# Patient Record
Sex: MALE | Race: WHITE | URBAN - NONMETROPOLITAN AREA
[De-identification: names, ages, dates, MRNs, and addresses within clinical notes are randomized per-mention and may not be internally consistent; named-entity substitution may affect disease eponyms.]

---

## 2020-10-23 ENCOUNTER — DOCTOR'S OFFICE (OUTPATIENT)
Dept: URBAN - NONMETROPOLITAN AREA CLINIC 1 | Facility: CLINIC | Age: 40
Setting detail: OPHTHALMOLOGY
End: 2020-10-23
Payer: COMMERCIAL

## 2020-10-23 ENCOUNTER — RX ONLY (RX ONLY)
Age: 40
End: 2020-10-23

## 2020-10-23 DIAGNOSIS — H43.813: ICD-10-CM

## 2020-10-23 DIAGNOSIS — H16.221: ICD-10-CM

## 2020-10-23 DIAGNOSIS — H16.223: ICD-10-CM

## 2020-10-23 DIAGNOSIS — H16.222: ICD-10-CM

## 2020-10-23 PROCEDURE — 83861 MICROFLUID ANALY TEARS: CPT | Performed by: OPHTHALMOLOGY

## 2020-10-23 PROCEDURE — 99203 OFFICE O/P NEW LOW 30 MIN: CPT | Performed by: OPHTHALMOLOGY

## 2020-10-23 PROCEDURE — 92134 CPTRZ OPH DX IMG PST SGM RTA: CPT | Performed by: OPHTHALMOLOGY

## 2020-10-23 ASSESSMENT — VISUAL ACUITY
OS_BCVA: 20/20-1
OD_BCVA: 20/20-1

## 2020-10-23 ASSESSMENT — LACRIMAL DUCT - ASSESSMENT
OD_LACRIMAL_DUCT: EPIPHORA
OS_LACRIMAL_DUCT: EPIPHORA

## 2020-10-23 ASSESSMENT — TONOMETRY
OD_IOP_MMHG: 17
OS_IOP_MMHG: 16

## 2020-10-23 ASSESSMENT — CONFRONTATIONAL VISUAL FIELD TEST (CVF)
OD_FINDINGS: FULL
OS_FINDINGS: FULL

## 2020-10-23 ASSESSMENT — REFRACTION_CURRENTRX
OS_OVR_VA: 20/
OD_OVR_VA: 20/

## 2020-12-22 ENCOUNTER — DOCTOR'S OFFICE (OUTPATIENT)
Dept: URBAN - NONMETROPOLITAN AREA CLINIC 1 | Facility: CLINIC | Age: 40
Setting detail: OPHTHALMOLOGY
End: 2020-12-22
Payer: COMMERCIAL

## 2020-12-22 DIAGNOSIS — H16.223: ICD-10-CM

## 2020-12-22 PROCEDURE — 92012 INTRM OPH EXAM EST PATIENT: CPT | Performed by: OPHTHALMOLOGY

## 2020-12-22 ASSESSMENT — TONOMETRY
OD_IOP_MMHG: 16
OS_IOP_MMHG: 16

## 2020-12-22 ASSESSMENT — KERATOMETRY
OD_AXISANGLE_DEGREES: 101
OD_K2POWER_DIOPTERS: 43.50
OD_K1POWER_DIOPTERS: 43.00
OS_AXISANGLE_DEGREES: 025
OS_K2POWER_DIOPTERS: 44.00
OS_K1POWER_DIOPTERS: 43.75

## 2020-12-22 ASSESSMENT — REFRACTION_CURRENTRX
OD_OVR_VA: 20/
OS_OVR_VA: 20/

## 2020-12-22 ASSESSMENT — REFRACTION_AUTOREFRACTION
OD_SPHERE: 0.00
OS_CYLINDER: -0.50
OD_CYLINDER: -0.25
OD_AXIS: 106
OS_AXIS: 049
OS_SPHERE: -0.25

## 2020-12-22 ASSESSMENT — SPHEQUIV_DERIVED
OS_SPHEQUIV: -0.5
OD_SPHEQUIV: -0.125

## 2020-12-22 ASSESSMENT — DRY EYES - PHYSICIAN NOTES
OD_GENERALCOMMENTS: OS
OS_GENERALCOMMENTS: OS

## 2020-12-22 ASSESSMENT — AXIALLENGTH_DERIVED
OS_AL: 23.6491
OD_AL: 23.7333

## 2020-12-22 ASSESSMENT — LACRIMAL DUCT - ASSESSMENT
OS_LACRIMAL_DUCT: EPIPHORA
OD_LACRIMAL_DUCT: EPIPHORA

## 2020-12-22 ASSESSMENT — VISUAL ACUITY
OS_BCVA: 20/20
OD_BCVA: 20/20

## 2020-12-22 ASSESSMENT — CONFRONTATIONAL VISUAL FIELD TEST (CVF)
OD_FINDINGS: FULL
OS_FINDINGS: FULL

## 2021-01-15 ENCOUNTER — DOCTOR'S OFFICE (OUTPATIENT)
Dept: URBAN - NONMETROPOLITAN AREA CLINIC 1 | Facility: CLINIC | Age: 41
Setting detail: OPHTHALMOLOGY
End: 2021-01-15
Payer: COMMERCIAL

## 2021-01-15 DIAGNOSIS — H43.813: ICD-10-CM

## 2021-01-15 PROCEDURE — 92201 OPSCPY EXTND RTA DRAW UNI/BI: CPT | Performed by: OPHTHALMOLOGY

## 2021-01-15 PROCEDURE — 92134 CPTRZ OPH DX IMG PST SGM RTA: CPT | Performed by: OPHTHALMOLOGY

## 2021-01-15 PROCEDURE — 92014 COMPRE OPH EXAM EST PT 1/>: CPT | Performed by: OPHTHALMOLOGY

## 2021-01-15 ASSESSMENT — LACRIMAL DUCT - ASSESSMENT
OD_LACRIMAL_DUCT: EPIPHORA
OS_LACRIMAL_DUCT: EPIPHORA

## 2021-01-15 ASSESSMENT — DRY EYES - PHYSICIAN NOTES
OD_GENERALCOMMENTS: OS
OS_GENERALCOMMENTS: OS

## 2021-01-15 ASSESSMENT — CONFRONTATIONAL VISUAL FIELD TEST (CVF)
OS_FINDINGS: FULL
OD_FINDINGS: FULL

## 2021-01-26 ASSESSMENT — AXIALLENGTH_DERIVED
OD_AL: 23.7333
OS_AL: 23.6491

## 2021-01-26 ASSESSMENT — REFRACTION_AUTOREFRACTION
OD_SPHERE: 0.00
OD_CYLINDER: -0.25
OS_AXIS: 049
OS_CYLINDER: -0.50
OD_AXIS: 106
OS_SPHERE: -0.25

## 2021-01-26 ASSESSMENT — REFRACTION_CURRENTRX
OD_OVR_VA: 20/
OS_OVR_VA: 20/

## 2021-01-26 ASSESSMENT — KERATOMETRY
OS_K1POWER_DIOPTERS: 43.75
OS_AXISANGLE_DEGREES: 025
OD_AXISANGLE_DEGREES: 101
OD_K2POWER_DIOPTERS: 43.50
OS_K2POWER_DIOPTERS: 44.00
OD_K1POWER_DIOPTERS: 43.00

## 2021-01-26 ASSESSMENT — VISUAL ACUITY
OS_BCVA: 20/20
OD_BCVA: 20/20-1

## 2021-01-26 ASSESSMENT — SPHEQUIV_DERIVED
OS_SPHEQUIV: -0.5
OD_SPHEQUIV: -0.125

## 2021-02-05 ENCOUNTER — DOCTOR'S OFFICE (OUTPATIENT)
Dept: URBAN - NONMETROPOLITAN AREA CLINIC 1 | Facility: CLINIC | Age: 41
Setting detail: OPHTHALMOLOGY
End: 2021-02-05
Payer: COMMERCIAL

## 2021-02-05 DIAGNOSIS — H16.222: ICD-10-CM

## 2021-02-05 DIAGNOSIS — H43.813: ICD-10-CM

## 2021-02-05 DIAGNOSIS — H16.223: ICD-10-CM

## 2021-02-05 PROBLEM — G24.5: Status: RESOLVED | Noted: 2020-10-23 | Resolved: 2021-02-05

## 2021-02-05 PROBLEM — H10.11 ALLERGIC CONJUNCTIVITS; RIGHT EYE, LEFT EYE, BOTH EYES: Status: RESOLVED | Noted: 2020-10-23 | Resolved: 2021-02-05

## 2021-02-05 PROBLEM — H10.13 ALLERGIC CONJUNCTIVITS; RIGHT EYE, LEFT EYE, BOTH EYES: Status: RESOLVED | Noted: 2020-10-23 | Resolved: 2021-02-05

## 2021-02-05 PROBLEM — H10.12 ALLERGIC CONJUNCTIVITS; RIGHT EYE, LEFT EYE, BOTH EYES: Status: RESOLVED | Noted: 2020-10-23 | Resolved: 2021-02-05

## 2021-02-05 PROCEDURE — 92012 INTRM OPH EXAM EST PATIENT: CPT | Performed by: OPHTHALMOLOGY

## 2021-02-05 PROCEDURE — 83861 MICROFLUID ANALY TEARS: CPT | Performed by: OPHTHALMOLOGY

## 2021-02-05 ASSESSMENT — CONFRONTATIONAL VISUAL FIELD TEST (CVF)
OD_FINDINGS: FULL
OS_FINDINGS: FULL

## 2021-02-05 ASSESSMENT — KERATOMETRY
OD_K2POWER_DIOPTERS: 43.50
OD_K1POWER_DIOPTERS: 43.00
OS_K2POWER_DIOPTERS: 44.00
OS_AXISANGLE_DEGREES: 025
OD_AXISANGLE_DEGREES: 101
OS_K1POWER_DIOPTERS: 43.75

## 2021-02-05 ASSESSMENT — REFRACTION_AUTOREFRACTION
OD_SPHERE: 0.00
OS_AXIS: 049
OD_CYLINDER: -0.25
OS_CYLINDER: -0.50
OS_SPHERE: -0.25
OD_AXIS: 106

## 2021-02-05 ASSESSMENT — LACRIMAL DUCT - ASSESSMENT
OD_LACRIMAL_DUCT: EPIPHORA
OS_LACRIMAL_DUCT: EPIPHORA

## 2021-02-05 ASSESSMENT — VISUAL ACUITY
OD_BCVA: 20/20
OS_BCVA: 20/20

## 2021-02-05 ASSESSMENT — AXIALLENGTH_DERIVED
OS_AL: 23.6491
OD_AL: 23.7333

## 2021-02-05 ASSESSMENT — SPHEQUIV_DERIVED
OD_SPHEQUIV: -0.125
OS_SPHEQUIV: -0.5

## 2021-02-05 ASSESSMENT — DRY EYES - PHYSICIAN NOTES
OD_GENERALCOMMENTS: OS
OS_GENERALCOMMENTS: OS

## 2021-02-05 ASSESSMENT — REFRACTION_CURRENTRX
OD_OVR_VA: 20/
OS_OVR_VA: 20/

## 2021-02-07 ENCOUNTER — APPOINTMENT (EMERGENCY)
Dept: RADIOLOGY | Facility: HOSPITAL | Age: 41
End: 2021-02-07
Payer: COMMERCIAL

## 2021-02-07 ENCOUNTER — HOSPITAL ENCOUNTER (EMERGENCY)
Facility: HOSPITAL | Age: 41
Discharge: HOME | End: 2021-02-07
Attending: EMERGENCY MEDICINE
Payer: COMMERCIAL

## 2021-02-07 VITALS
OXYGEN SATURATION: 95 % | HEART RATE: 103 BPM | TEMPERATURE: 102.2 F | RESPIRATION RATE: 30 BRPM | BODY MASS INDEX: 36.1 KG/M2 | DIASTOLIC BLOOD PRESSURE: 68 MMHG | HEIGHT: 67 IN | SYSTOLIC BLOOD PRESSURE: 127 MMHG | WEIGHT: 230 LBS

## 2021-02-07 DIAGNOSIS — U07.1 COVID-19: Primary | ICD-10-CM

## 2021-02-07 LAB
ALBUMIN SERPL BCP-MCNC: 3.2 G/DL (ref 3.5–5)
ALP SERPL-CCNC: 51 U/L (ref 46–116)
ALT SERPL W P-5'-P-CCNC: 45 U/L (ref 12–78)
ANION GAP SERPL CALCULATED.3IONS-SCNC: 7 MMOL/L (ref 4–13)
AST SERPL W P-5'-P-CCNC: 34 U/L (ref 5–45)
BASOPHILS # BLD AUTO: 0.01 THOUSANDS/ΜL (ref 0–0.1)
BASOPHILS NFR BLD AUTO: 0 % (ref 0–1)
BILIRUB SERPL-MCNC: 0.24 MG/DL (ref 0.2–1)
BUN SERPL-MCNC: 24 MG/DL (ref 5–25)
CALCIUM ALBUM COR SERPL-MCNC: 8.6 MG/DL (ref 8.3–10.1)
CALCIUM SERPL-MCNC: 8 MG/DL (ref 8.3–10.1)
CHLORIDE SERPL-SCNC: 103 MMOL/L (ref 100–108)
CK MB SERPL-MCNC: 2 NG/ML (ref 0–5)
CK MB SERPL-MCNC: <1 % (ref 0–2.5)
CK SERPL-CCNC: 413 U/L (ref 39–308)
CO2 SERPL-SCNC: 28 MMOL/L (ref 21–32)
CREAT SERPL-MCNC: 1.25 MG/DL (ref 0.6–1.3)
CRP SERPL QL: 42.9 MG/L
EOSINOPHIL # BLD AUTO: 0.01 THOUSAND/ΜL (ref 0–0.61)
EOSINOPHIL NFR BLD AUTO: 0 % (ref 0–6)
ERYTHROCYTE [DISTWIDTH] IN BLOOD BY AUTOMATED COUNT: 12.2 % (ref 11.6–15.1)
FLUAV RNA RESP QL NAA+PROBE: NEGATIVE
FLUBV RNA RESP QL NAA+PROBE: NEGATIVE
GFR SERPL CREATININE-BSD FRML MDRD: 72 ML/MIN/1.73SQ M
GLUCOSE SERPL-MCNC: 151 MG/DL (ref 65–140)
HCT VFR BLD AUTO: 43.3 % (ref 36.5–49.3)
HGB BLD-MCNC: 14.3 G/DL (ref 12–17)
IMM GRANULOCYTES # BLD AUTO: 0.01 THOUSAND/UL (ref 0–0.2)
IMM GRANULOCYTES NFR BLD AUTO: 0 % (ref 0–2)
LACTATE SERPL-SCNC: 1.3 MMOL/L (ref 0.5–2)
LYMPHOCYTES # BLD AUTO: 0.37 THOUSANDS/ΜL (ref 0.6–4.47)
LYMPHOCYTES NFR BLD AUTO: 10 % (ref 14–44)
MAGNESIUM SERPL-MCNC: 1.7 MG/DL (ref 1.6–2.6)
MCH RBC QN AUTO: 30.7 PG (ref 26.8–34.3)
MCHC RBC AUTO-ENTMCNC: 33 G/DL (ref 31.4–37.4)
MCV RBC AUTO: 93 FL (ref 82–98)
MONOCYTES # BLD AUTO: 0.29 THOUSAND/ΜL (ref 0.17–1.22)
MONOCYTES NFR BLD AUTO: 8 % (ref 4–12)
NEUTROPHILS # BLD AUTO: 3.12 THOUSANDS/ΜL (ref 1.85–7.62)
NEUTS SEG NFR BLD AUTO: 82 % (ref 43–75)
NRBC BLD AUTO-RTO: 0 /100 WBCS
NT-PROBNP SERPL-MCNC: 9 PG/ML
PLATELET # BLD AUTO: 138 THOUSANDS/UL (ref 149–390)
PMV BLD AUTO: 10.9 FL (ref 8.9–12.7)
POTASSIUM SERPL-SCNC: 4.1 MMOL/L (ref 3.5–5.3)
PROT SERPL-MCNC: 6.6 G/DL (ref 6.4–8.2)
RBC # BLD AUTO: 4.66 MILLION/UL (ref 3.88–5.62)
RSV RNA RESP QL NAA+PROBE: NEGATIVE
SARS-COV-2 RNA RESP QL NAA+PROBE: POSITIVE
SODIUM SERPL-SCNC: 138 MMOL/L (ref 136–145)
TROPONIN I SERPL-MCNC: <0.02 NG/ML
WBC # BLD AUTO: 3.81 THOUSAND/UL (ref 4.31–10.16)

## 2021-02-07 PROCEDURE — 86140 C-REACTIVE PROTEIN: CPT | Performed by: EMERGENCY MEDICINE

## 2021-02-07 PROCEDURE — 83880 ASSAY OF NATRIURETIC PEPTIDE: CPT | Performed by: EMERGENCY MEDICINE

## 2021-02-07 PROCEDURE — 99285 EMERGENCY DEPT VISIT HI MDM: CPT | Performed by: EMERGENCY MEDICINE

## 2021-02-07 PROCEDURE — 82550 ASSAY OF CK (CPK): CPT | Performed by: EMERGENCY MEDICINE

## 2021-02-07 PROCEDURE — 93005 ELECTROCARDIOGRAM TRACING: CPT

## 2021-02-07 PROCEDURE — 83605 ASSAY OF LACTIC ACID: CPT | Performed by: EMERGENCY MEDICINE

## 2021-02-07 PROCEDURE — 83735 ASSAY OF MAGNESIUM: CPT | Performed by: EMERGENCY MEDICINE

## 2021-02-07 PROCEDURE — 84484 ASSAY OF TROPONIN QUANT: CPT | Performed by: EMERGENCY MEDICINE

## 2021-02-07 PROCEDURE — 85025 COMPLETE CBC W/AUTO DIFF WBC: CPT | Performed by: EMERGENCY MEDICINE

## 2021-02-07 PROCEDURE — 99284 EMERGENCY DEPT VISIT MOD MDM: CPT

## 2021-02-07 PROCEDURE — 71045 X-RAY EXAM CHEST 1 VIEW: CPT

## 2021-02-07 PROCEDURE — 80053 COMPREHEN METABOLIC PANEL: CPT | Performed by: EMERGENCY MEDICINE

## 2021-02-07 PROCEDURE — 36415 COLL VENOUS BLD VENIPUNCTURE: CPT | Performed by: EMERGENCY MEDICINE

## 2021-02-07 PROCEDURE — 0241U HB NFCT DS VIR RESP RNA 4 TRGT: CPT | Performed by: EMERGENCY MEDICINE

## 2021-02-07 PROCEDURE — 96374 THER/PROPH/DIAG INJ IV PUSH: CPT

## 2021-02-07 PROCEDURE — 87040 BLOOD CULTURE FOR BACTERIA: CPT | Performed by: EMERGENCY MEDICINE

## 2021-02-07 PROCEDURE — 82553 CREATINE MB FRACTION: CPT | Performed by: EMERGENCY MEDICINE

## 2021-02-07 PROCEDURE — 96361 HYDRATE IV INFUSION ADD-ON: CPT

## 2021-02-07 RX ORDER — ACETAMINOPHEN 325 MG/1
650 TABLET ORAL ONCE
Status: COMPLETED | OUTPATIENT
Start: 2021-02-07 | End: 2021-02-07

## 2021-02-07 RX ORDER — SODIUM CHLORIDE 9 MG/ML
150 INJECTION, SOLUTION INTRAVENOUS CONTINUOUS
Status: DISCONTINUED | OUTPATIENT
Start: 2021-02-07 | End: 2021-02-08 | Stop reason: HOSPADM

## 2021-02-07 RX ORDER — IBUPROFEN 600 MG/1
600 TABLET ORAL ONCE
Status: COMPLETED | OUTPATIENT
Start: 2021-02-07 | End: 2021-02-07

## 2021-02-07 RX ORDER — KETOROLAC TROMETHAMINE 30 MG/ML
15 INJECTION, SOLUTION INTRAMUSCULAR; INTRAVENOUS ONCE
Status: COMPLETED | OUTPATIENT
Start: 2021-02-07 | End: 2021-02-07

## 2021-02-07 RX ADMIN — ACETAMINOPHEN 650 MG: 325 TABLET ORAL at 21:09

## 2021-02-07 RX ADMIN — IBUPROFEN 600 MG: 600 TABLET ORAL at 22:22

## 2021-02-07 RX ADMIN — SODIUM CHLORIDE 1000 ML: 0.9 INJECTION, SOLUTION INTRAVENOUS at 21:15

## 2021-02-07 RX ADMIN — SODIUM CHLORIDE 1000 ML: 0.9 INJECTION, SOLUTION INTRAVENOUS at 21:14

## 2021-02-07 RX ADMIN — KETOROLAC TROMETHAMINE 15 MG: 30 INJECTION, SOLUTION INTRAMUSCULAR; INTRAVENOUS at 21:09

## 2021-02-07 NOTE — Clinical Note
Mary Nish was seen and treated in our emergency department on 2/7/2021  Diagnosis:     Sherley Vazquez  may return to work on return date  He may return on this date: 02/18/2021         If you have any questions or concerns, please don't hesitate to call        Amy Lopez DO    ______________________________           _______________          _______________  Hospital Representative                              Date                                Time

## 2021-02-08 LAB
ATRIAL RATE: 105 BPM
P AXIS: 59 DEGREES
PR INTERVAL: 130 MS
QRS AXIS: 76 DEGREES
QRSD INTERVAL: 96 MS
QT INTERVAL: 318 MS
QTC INTERVAL: 420 MS
T WAVE AXIS: 41 DEGREES
VENTRICULAR RATE: 105 BPM

## 2021-02-08 NOTE — ED PROVIDER NOTES
History  Chief Complaint   Patient presents with    Generalized Body Aches     Patient presents to the ED via walk-in for evaluation of generalized body aches onset, chills  Patient is a 17-year-old male, otherwise healthy, presenting to the emergency department complaining of body aches, fevers, chills, mild cough, mild headache, symptoms present x4 days now, denies any nausea vomiting or diarrhea, no sore throat, no ear, no chest pain or shortness of breath abdominal, denies any sick contacts, no known COVID-19          None       Past Medical History:   Diagnosis Date    Asthma        History reviewed  No pertinent surgical history  History reviewed  No pertinent family history  I have reviewed and agree with the history as documented  E-Cigarette/Vaping    E-Cigarette Use Never User      E-Cigarette/Vaping Substances     Social History     Tobacco Use    Smoking status: Never Smoker    Smokeless tobacco: Never Used   Substance Use Topics    Alcohol use: Yes    Drug use: Never       Review of Systems   Constitutional: Positive for chills, fatigue and fever  HENT: Positive for congestion  Eyes: Negative  Respiratory: Positive for cough  Cardiovascular: Negative  Gastrointestinal: Negative  Endocrine: Negative  Genitourinary: Negative  Musculoskeletal: Positive for myalgias  Skin: Negative  Allergic/Immunologic: Negative  Neurological: Negative  Hematological: Negative  Psychiatric/Behavioral: Negative  Physical Exam  Physical Exam  Constitutional:       Appearance: He is well-developed  He is ill-appearing  HENT:      Head: Normocephalic and atraumatic  Nose: Nose normal       Mouth/Throat:      Mouth: Mucous membranes are moist    Eyes:      Conjunctiva/sclera: Conjunctivae normal       Pupils: Pupils are equal, round, and reactive to light  Neck:      Musculoskeletal: Normal range of motion and neck supple     Cardiovascular:      Rate and Rhythm: Tachycardia present  Pulmonary:      Effort: Pulmonary effort is normal    Abdominal:      Palpations: Abdomen is soft  Tenderness: There is no abdominal tenderness  Musculoskeletal: Normal range of motion  Skin:     General: Skin is warm and dry  Neurological:      Mental Status: He is alert and oriented to person, place, and time  Vital Signs  ED Triage Vitals [02/07/21 2052]   Temperature Pulse Respirations Blood Pressure SpO2   (!) 103 5 °F (39 7 °C) (!) 109 22 141/86 96 %      Temp Source Heart Rate Source Patient Position - Orthostatic VS BP Location FiO2 (%)   Temporal Monitor Lying Right arm --      Pain Score       5           Vitals:    02/07/21 2100 02/07/21 2115 02/07/21 2130 02/07/21 2145   BP: 135/83 134/86 141/77 127/68   Pulse: (!) 107 103 (!) 107 103   Patient Position - Orthostatic VS:             Visual Acuity      ED Medications  Medications   sodium chloride 0 9 % bolus 1,000 mL (0 mL Intravenous Stopped 2/7/21 2220)   ketorolac (TORADOL) injection 15 mg (15 mg Intravenous Given 2/7/21 2109)   acetaminophen (TYLENOL) tablet 650 mg (650 mg Oral Given 2/7/21 2109)   sodium chloride 0 9 % bolus 1,000 mL (0 mL Intravenous Stopped 2/7/21 2202)   ibuprofen (MOTRIN) tablet 600 mg (600 mg Oral Given 2/7/21 2222)       Diagnostic Studies  Results Reviewed     Procedure Component Value Units Date/Time    Blood culture #1 [[id number]] Collected: 02/07/21 2107    Lab Status: Preliminary result Specimen: Blood from Arm, Right Updated: 02/10/21 1002     Blood Culture No Growth at 48 hrs  Blood culture #2 [[id number]] Collected: 02/07/21 2107    Lab Status: Preliminary result Specimen: Blood from Hand, Right Updated: 02/10/21 1002     Blood Culture No Growth at 48 hrs      COVID19, Influenza A/B, RSV PCR, [facility] [[id number]]  (Abnormal) Collected: 02/07/21 2106    Lab Status: Final result Specimen: Nares from Nasopharyngeal Swab Updated: 02/07/21 2201     SARS-CoV-2 Positive INFLUENZA A PCR Negative     INFLUENZA B PCR Negative     RSV PCR Negative    Narrative: This test has been authorized by FDA under an EUA (Emergency Use Assay) for use by authorized laboratories  Clinical caution and judgement should be used with the interpretation of these results with consideration of the clinical impression and other laboratory testing  Testing reported as "Positive" or "Negative" has been proven to be accurate according to standard laboratory validation requirements  All testing is performed with control materials showing appropriate reactivity at standard intervals  CKMB [[id number]]  (Normal) Collected: 02/07/21 2106    Lab Status: Final result Specimen: Blood from Arm, Right Updated: 02/07/21 2200     CK-MB Index <1 0 %      CK-MB 2 0 ng/mL     Lactic acid, plasma [[id number]]  (Normal) Collected: 02/07/21 2106    Lab Status: Final result Specimen: Blood from Arm, Right Updated: 02/07/21 2139     LACTIC ACID 1 3 mmol/L     Narrative:      Result may be elevated if tourniquet was used during collection      Magnesium [[id number]]  (Normal) Collected: 02/07/21 2106    Lab Status: Final result Specimen: Blood from Arm, Right Updated: 02/07/21 2139     Magnesium 1 7 mg/dL     C-reactive protein [[id number]]  (Abnormal) Collected: 02/07/21 2106    Lab Status: Final result Specimen: Blood from Arm, Right Updated: 02/07/21 2139     CRP 42 9 mg/L     NT-BNP PRO [[id number]]  (Normal) Collected: 02/07/21 2106    Lab Status: Final result Specimen: Blood from Arm, Right Updated: 02/07/21 2139     NT-proBNP 9 pg/mL     CK (with reflex to MB) [[id number]]  (Abnormal) Collected: 02/07/21 2106    Lab Status: Final result Specimen: Blood from Arm, Right Updated: 02/07/21 2139     Total  U/L     Troponin I [[id number]]  (Normal) Collected: 02/07/21 2106    Lab Status: Final result Specimen: Blood from Arm, Right Updated: 02/07/21 2136     Troponin I <0 02 ng/mL     Comprehensive metabolic panel [[id number]]  (Abnormal) Collected: 02/07/21 2106    Lab Status: Final result Specimen: Blood from Arm, Right Updated: 02/07/21 2135     Sodium 138 mmol/L      Potassium 4 1 mmol/L      Chloride 103 mmol/L      CO2 28 mmol/L      ANION GAP 7 mmol/L      BUN 24 mg/dL      Creatinine 1 25 mg/dL      Glucose 151 mg/dL      Calcium 8 0 mg/dL      Corrected Calcium 8 6 mg/dL      AST 34 U/L      ALT 45 U/L      Alkaline Phosphatase 51 U/L      Total Protein 6 6 g/dL      Albumin 3 2 g/dL      Total Bilirubin 0 24 mg/dL      eGFR 72 ml/min/1 73sq m     Narrative:      [place] guidelines for Chronic Kidney Disease (CKD):     Stage 1 with normal or high GFR (GFR > 90 mL/min/1 73 square meters)    Stage 2 Mild CKD (GFR = 60-89 mL/min/1 73 square meters)    Stage 3A Moderate CKD (GFR = 45-59 mL/min/1 73 square meters)    Stage 3B Moderate CKD (GFR = 30-44 mL/min/1 73 square meters)    Stage 4 Severe CKD (GFR = 15-29 mL/min/1 73 square meters)    Stage 5 End Stage CKD (GFR <15 mL/min/1 73 square meters)  Note: GFR calculation is accurate only with a steady state creatinine    CBC and differential [[id number]]  (Abnormal) Collected: 02/07/21 2106    Lab Status: Final result Specimen: Blood from Arm, Right Updated: 02/07/21 2123     WBC 3 81 Thousand/uL      RBC 4 66 Million/uL      Hemoglobin 14 3 g/dL      Hematocrit 43 3 %      MCV 93 fL      MCH 30 7 pg      MCHC 33 0 g/dL      RDW 12 2 %      MPV 10 9 fL      Platelets 813 Thousands/uL      nRBC 0 /100 WBCs      Neutrophils Relative 82 %      Immat GRANS % 0 %      Lymphocytes Relative 10 %      Monocytes Relative 8 %      Eosinophils Relative 0 %      Basophils Relative 0 %      Neutrophils Absolute 3 12 Thousands/µL      Immature Grans Absolute 0 01 Thousand/uL      Lymphocytes Absolute 0 37 Thousands/µL      Monocytes Absolute 0 29 Thousand/µL      Eosinophils Absolute 0 01 Thousand/µL      Basophils Absolute 0 01 Thousands/µL XR chest 1 view portable   ED Interpretation by Sarina Delarosa DO (02/07 2114)   No acute findings      Final Result by Jared Serrato MD (02/08 0920)      No acute cardiopulmonary disease                    Workstation performed: [id number]                    Procedures  ECG 12 Lead Documentation Only    Date/Time: 2/7/2021 9:32 PM  Performed by: Sarina Delarosa DO  Authorized by: Sarina Delarosa DO     Indications / Diagnosis:  Tachycardia  ECG reviewed by me, the ED Provider: yes    Patient location:  ED  Previous ECG:     Previous ECG:  Unavailable    Comparison to cardiac monitor: Yes    Interpretation:     Interpretation: non-specific    Rate:     ECG rate:  105    ECG rate assessment: tachycardic    Rhythm:     Rhythm: sinus rhythm and sinus tachycardia    Ectopy:     Ectopy: none    QRS:     QRS intervals:  Normal  Conduction:     Conduction: normal    ST segments:     ST segments:  Normal  T waves:     T waves: normal               ED Course  ED Course as of Feb 10 1858   Sun Feb 07, 2021 2204 Patient COVID test positive, mild elevation of inflammatory markers, chest x-ray clear and vital signs otherwise stable, patient did not become hypoxic in any point in time, he does report having a pulse oximeter at home, was advised to keep a close eye on his pulse ox and if it drops below 90% he should return to the hospital for oxygen therapy, other than that good supportive care including over-the-counter vitamins, antipyretics, plenty of fluids and rest, he was also advised to self quarantine according to [facility] and [address] recommendations and also advised to have all of his household members self quarantine, patient acknowledges understanding and agreement with this plan                                                Disposition  Final diagnoses:   COVID-19     Time reflects when diagnosis was documented in both MDM as applicable and the Disposition within this note     Time User Action Codes Description Comment    2/7/2021 10:03 PM Boris Resendiz Add [U07 1] COVID-19       ED Disposition     ED Disposition Condition Date/Time Comment    Discharge Stable Sun Feb 7, 2021 10:03 PM Marcus Cortes discharge to home/self care  Follow-up Information     Follow up With Specialties Details Why Contact Info    ELISABETH Dela Cruz Nurse Practitioner  As needed [facility]/ [place] [address]  [phone]            There are no discharge medications for this patient  No discharge procedures on file      PDMP Review     None          ED Provider  Electronically Signed by           Zulay Melendez DO  02/10/21 7189

## 2021-02-08 NOTE — ED NOTES
IV catheter discontinued intact  Site without signs and symptoms of complications  Dressing and pressure applied  Patient verbalized understanding of discharge instructions including medication administration and follow-up care  No further questions or concerns at this time  Discharged at bedside by Dr Wava Schlatter prior to leaving facility        Joel Silva RN  02/07/21 9737

## 2021-02-13 LAB
BACTERIA BLD CULT: NORMAL
BACTERIA BLD CULT: NORMAL

## 2021-03-12 ENCOUNTER — AMBUL SURGICAL CARE (OUTPATIENT)
Dept: URBAN - NONMETROPOLITAN AREA SURGERY 1 | Facility: SURGERY | Age: 41
Setting detail: OPHTHALMOLOGY
End: 2021-03-12
Payer: COMMERCIAL

## 2021-03-12 DIAGNOSIS — H04.223: ICD-10-CM

## 2021-03-12 PROCEDURE — G8907 PT DOC NO EVENTS ON DISCHARG: HCPCS | Performed by: OPHTHALMOLOGY

## 2021-03-12 PROCEDURE — G8918 PT W/O PREOP ORDER IV AB PRO: HCPCS | Performed by: OPHTHALMOLOGY

## 2021-03-12 PROCEDURE — 68840 EXPLORE/IRRIGATE TEAR DUCTS: CPT | Performed by: OPHTHALMOLOGY

## 2021-03-26 ENCOUNTER — DOCTOR'S OFFICE (OUTPATIENT)
Dept: URBAN - NONMETROPOLITAN AREA CLINIC 1 | Facility: CLINIC | Age: 41
Setting detail: OPHTHALMOLOGY
End: 2021-03-26
Payer: COMMERCIAL

## 2021-03-26 DIAGNOSIS — H04.223: ICD-10-CM

## 2021-03-26 DIAGNOSIS — G51.4: ICD-10-CM

## 2021-03-26 PROBLEM — H16.221 KERATOCONJUNCTIVITIS SICCA; RIGHT EYE, LEFT EYE: Status: ACTIVE | Noted: 2020-10-23

## 2021-03-26 PROBLEM — H16.222 KERATOCONJUNCTIVITIS SICCA; RIGHT EYE, LEFT EYE: Status: ACTIVE | Noted: 2020-10-23

## 2021-03-26 PROBLEM — H43.813 POSTERIOR VITREOUS DETACHMENT; BOTH EYES: Status: ACTIVE | Noted: 2020-10-23

## 2021-03-26 PROCEDURE — 92012 INTRM OPH EXAM EST PATIENT: CPT | Performed by: OPHTHALMOLOGY

## 2021-03-26 ASSESSMENT — REFRACTION_CURRENTRX
OD_OVR_VA: 20/
OS_OVR_VA: 20/

## 2021-03-26 ASSESSMENT — REFRACTION_AUTOREFRACTION
OS_AXIS: 049
OD_CYLINDER: -0.25
OS_SPHERE: -0.25
OS_CYLINDER: -0.50
OD_AXIS: 106
OD_SPHERE: 0.00

## 2021-03-26 ASSESSMENT — KERATOMETRY
OS_AXISANGLE_DEGREES: 025
OD_K1POWER_DIOPTERS: 43.00
OD_K2POWER_DIOPTERS: 43.50
OS_K1POWER_DIOPTERS: 43.75
OD_AXISANGLE_DEGREES: 101
OS_K2POWER_DIOPTERS: 44.00

## 2021-03-26 ASSESSMENT — CONFRONTATIONAL VISUAL FIELD TEST (CVF)
OS_FINDINGS: FULL
OD_FINDINGS: FULL

## 2021-03-26 ASSESSMENT — SPHEQUIV_DERIVED
OD_SPHEQUIV: -0.125
OS_SPHEQUIV: -0.5

## 2021-03-26 ASSESSMENT — VISUAL ACUITY
OS_BCVA: 20/20
OD_BCVA: 20/25-2

## 2021-03-26 ASSESSMENT — AXIALLENGTH_DERIVED
OS_AL: 23.6491
OD_AL: 23.7333

## 2023-11-24 ENCOUNTER — APPOINTMENT (EMERGENCY)
Dept: RADIOLOGY | Facility: HOSPITAL | Age: 43
End: 2023-11-24
Payer: COMMERCIAL

## 2023-11-24 ENCOUNTER — HOSPITAL ENCOUNTER (EMERGENCY)
Facility: HOSPITAL | Age: 43
Discharge: HOME | End: 2023-11-24
Attending: EMERGENCY MEDICINE
Payer: COMMERCIAL

## 2023-11-24 VITALS
DIASTOLIC BLOOD PRESSURE: 89 MMHG | OXYGEN SATURATION: 99 % | RESPIRATION RATE: 17 BRPM | SYSTOLIC BLOOD PRESSURE: 139 MMHG | BODY MASS INDEX: 36.64 KG/M2 | WEIGHT: 233.91 LBS | TEMPERATURE: 97.6 F | HEART RATE: 84 BPM

## 2023-11-24 DIAGNOSIS — J45.901 ASTHMA EXACERBATION: ICD-10-CM

## 2023-11-24 DIAGNOSIS — J06.9 URI (UPPER RESPIRATORY INFECTION): Primary | ICD-10-CM

## 2023-11-24 LAB
ALBUMIN SERPL BCP-MCNC: 4.3 G/DL (ref 3.5–5)
ALP SERPL-CCNC: 43 U/L (ref 34–104)
ALT SERPL W P-5'-P-CCNC: 23 U/L (ref 7–52)
ANION GAP SERPL CALCULATED.3IONS-SCNC: 8 MMOL/L
AST SERPL W P-5'-P-CCNC: 25 U/L (ref 13–39)
BASOPHILS # BLD AUTO: 0.05 THOUSANDS/ÂΜL (ref 0–0.1)
BASOPHILS NFR BLD AUTO: 1 % (ref 0–1)
BILIRUB SERPL-MCNC: 0.35 MG/DL (ref 0.2–1)
BUN SERPL-MCNC: 18 MG/DL (ref 5–25)
CALCIUM SERPL-MCNC: 9 MG/DL (ref 8.4–10.2)
CHLORIDE SERPL-SCNC: 102 MMOL/L (ref 96–108)
CO2 SERPL-SCNC: 28 MMOL/L (ref 21–32)
CREAT SERPL-MCNC: 1.35 MG/DL (ref 0.6–1.3)
EOSINOPHIL # BLD AUTO: 0.42 THOUSAND/ÂΜL (ref 0–0.61)
EOSINOPHIL NFR BLD AUTO: 6 % (ref 0–6)
ERYTHROCYTE [DISTWIDTH] IN BLOOD BY AUTOMATED COUNT: 11.9 % (ref 11.6–15.1)
FLUAV RNA RESP QL NAA+PROBE: NEGATIVE
FLUBV RNA RESP QL NAA+PROBE: NEGATIVE
GFR SERPL CREATININE-BSD FRML MDRD: 63 ML/MIN/1.73SQ M
GLUCOSE SERPL-MCNC: 112 MG/DL (ref 65–140)
HCT VFR BLD AUTO: 44.2 % (ref 36.5–49.3)
HGB BLD-MCNC: 14.5 G/DL (ref 12–17)
IMM GRANULOCYTES # BLD AUTO: 0.05 THOUSAND/UL (ref 0–0.2)
IMM GRANULOCYTES NFR BLD AUTO: 1 % (ref 0–2)
LYMPHOCYTES # BLD AUTO: 1.36 THOUSANDS/ÂΜL (ref 0.6–4.47)
LYMPHOCYTES NFR BLD AUTO: 18 % (ref 14–44)
MCH RBC QN AUTO: 29.8 PG (ref 26.8–34.3)
MCHC RBC AUTO-ENTMCNC: 32.8 G/DL (ref 31.4–37.4)
MCV RBC AUTO: 91 FL (ref 82–98)
MONOCYTES # BLD AUTO: 0.51 THOUSAND/ÂΜL (ref 0.17–1.22)
MONOCYTES NFR BLD AUTO: 7 % (ref 4–12)
NEUTROPHILS # BLD AUTO: 5.13 THOUSANDS/ÂΜL (ref 1.85–7.62)
NEUTS SEG NFR BLD AUTO: 67 % (ref 43–75)
NRBC BLD AUTO-RTO: 0 /100 WBCS
PLATELET # BLD AUTO: 194 THOUSANDS/UL (ref 149–390)
PMV BLD AUTO: 10.8 FL (ref 8.9–12.7)
POTASSIUM SERPL-SCNC: 3.5 MMOL/L (ref 3.5–5.3)
PROT SERPL-MCNC: 7.1 G/DL (ref 6.4–8.4)
RBC # BLD AUTO: 4.86 MILLION/UL (ref 3.88–5.62)
RSV RNA RESP QL NAA+PROBE: NEGATIVE
SARS-COV-2 RNA RESP QL NAA+PROBE: NEGATIVE
SODIUM SERPL-SCNC: 138 MMOL/L (ref 135–147)
WBC # BLD AUTO: 7.52 THOUSAND/UL (ref 4.31–10.16)

## 2023-11-24 PROCEDURE — 80053 COMPREHEN METABOLIC PANEL: CPT | Performed by: EMERGENCY MEDICINE

## 2023-11-24 PROCEDURE — 94644 CONT INHLJ TX 1ST HOUR: CPT

## 2023-11-24 PROCEDURE — 85025 COMPLETE CBC W/AUTO DIFF WBC: CPT | Performed by: EMERGENCY MEDICINE

## 2023-11-24 PROCEDURE — 99283 EMERGENCY DEPT VISIT LOW MDM: CPT

## 2023-11-24 PROCEDURE — 94664 DEMO&/EVAL PT USE INHALER: CPT

## 2023-11-24 PROCEDURE — 0241U HB NFCT DS VIR RESP RNA 4 TRGT: CPT | Performed by: EMERGENCY MEDICINE

## 2023-11-24 PROCEDURE — 99284 EMERGENCY DEPT VISIT MOD MDM: CPT | Performed by: EMERGENCY MEDICINE

## 2023-11-24 PROCEDURE — 94640 AIRWAY INHALATION TREATMENT: CPT

## 2023-11-24 PROCEDURE — 71046 X-RAY EXAM CHEST 2 VIEWS: CPT

## 2023-11-24 PROCEDURE — 94760 N-INVAS EAR/PLS OXIMETRY 1: CPT

## 2023-11-24 PROCEDURE — 36415 COLL VENOUS BLD VENIPUNCTURE: CPT | Performed by: EMERGENCY MEDICINE

## 2023-11-24 RX ORDER — SODIUM CHLORIDE FOR INHALATION 0.9 %
12 VIAL, NEBULIZER (ML) INHALATION ONCE
Status: COMPLETED | OUTPATIENT
Start: 2023-11-24 | End: 2023-11-24

## 2023-11-24 RX ORDER — ALBUTEROL SULFATE 2.5 MG/3ML
2.5 SOLUTION RESPIRATORY (INHALATION) EVERY 4 HOURS
Qty: 90 ML | Refills: 0 | Status: SHIPPED | OUTPATIENT
Start: 2023-11-24 | End: 2023-11-27

## 2023-11-24 RX ORDER — DEXAMETHASONE 4 MG/1
10 TABLET ORAL ONCE
Status: COMPLETED | OUTPATIENT
Start: 2023-11-24 | End: 2023-11-24

## 2023-11-24 RX ORDER — ALBUTEROL SULFATE 90 UG/1
AEROSOL, METERED RESPIRATORY (INHALATION)
Qty: 18 G | Refills: 0 | Status: SHIPPED | OUTPATIENT
Start: 2023-11-24

## 2023-11-24 RX ORDER — PREDNISONE 10 MG/1
50 TABLET ORAL DAILY
Qty: 25 TABLET | Refills: 0 | Status: SHIPPED | OUTPATIENT
Start: 2023-11-24 | End: 2023-11-29

## 2023-11-24 RX ADMIN — DEXAMETHASONE 10 MG: 4 TABLET ORAL at 21:36

## 2023-11-24 RX ADMIN — ISODIUM CHLORIDE 12 ML: 0.03 SOLUTION RESPIRATORY (INHALATION) at 21:47

## 2023-11-24 RX ADMIN — ALBUTEROL SULFATE 10 MG: 2.5 SOLUTION RESPIRATORY (INHALATION) at 21:50

## 2023-11-24 RX ADMIN — IPRATROPIUM BROMIDE 1 MG: 0.5 SOLUTION RESPIRATORY (INHALATION) at 21:47

## 2023-11-24 NOTE — Clinical Note
Juliana Paz was seen and treated in our emergency department on 11/24/2023. Diagnosis:     Macario Luna  may return to work on return date. He may return on this date: 11/28/2023         If you have any questions or concerns, please don't hesitate to call.       Souleymane Araya, DO    ______________________________           _______________          _______________  Hospital Representative                              Date                                Time

## 2023-11-25 NOTE — ED PROVIDER NOTES
History  Chief Complaint   Patient presents with    Cough     Patient reports he started w/ sneezing and a runny nose on Friday which developed into a cough and chest congestion/tightness on Saturday. Patient reports cough is dry. Does have hx asthma and allergies. Reports he tried an OTC med. Has been doing breathing treatments the past 2 days at home. 49-year-old male accompanied by spouse describes cough for the past week with some nonbloody sputum, began with nasal congestion sneezing, not amenable to albuterol inhaler as well as nebulized albuterol treatments twice daily. Past medical history includes asthma, no recent hospitalization or steroid use, no ICU admissions. History provided by:  Patient  Cough  Cough characteristics:  Productive  Sputum characteristics:  Nondescript  Onset quality:  Gradual  Duration:  7 days  Timing:  Constant  Progression:  Worsening  Chronicity:  New  Context: upper respiratory infection    Relieved by:  Nothing  Worsened by:  Nothing  Ineffective treatments:  Beta-agonist inhaler  Associated symptoms: shortness of breath    Associated symptoms: no chest pain and no fever    Risk factors: recent infection        None       Past Medical History:   Diagnosis Date    Asthma        History reviewed. No pertinent surgical history. History reviewed. No pertinent family history. I have reviewed and agree with the history as documented. E-Cigarette/Vaping    E-Cigarette Use Never User      E-Cigarette/Vaping Substances     Social History     Tobacco Use    Smoking status: Never    Smokeless tobacco: Never   Vaping Use    Vaping Use: Never used   Substance Use Topics    Alcohol use: Yes    Drug use: Never       Review of Systems   Constitutional:  Negative for fever. Respiratory:  Positive for cough and shortness of breath. Cardiovascular:  Negative for chest pain. All other systems reviewed and are negative.       Physical Exam  Physical Exam  Vitals and nursing note reviewed. Constitutional:       Comments: Pleasant, comfortable-appearing   HENT:      Head: Normocephalic and atraumatic. Mouth/Throat:      Mouth: Mucous membranes are moist.      Pharynx: Oropharynx is clear. Eyes:      Conjunctiva/sclera: Conjunctivae normal.      Pupils: Pupils are equal, round, and reactive to light. Cardiovascular:      Rate and Rhythm: Normal rate and regular rhythm. Heart sounds: Normal heart sounds. Pulmonary:      Effort: Pulmonary effort is normal. No respiratory distress. Breath sounds: Wheezing present. Comments: Mildly decreased breath sounds bilaterally, symmetric  Abdominal:      General: Bowel sounds are normal. There is no distension. Palpations: Abdomen is soft. Tenderness: There is no abdominal tenderness. Musculoskeletal:         General: No deformity. Cervical back: Neck supple. Skin:     General: Skin is warm and dry. Neurological:      General: No focal deficit present. Mental Status: He is alert and oriented to person, place, and time. Cranial Nerves: No cranial nerve deficit. Coordination: Coordination normal.   Psychiatric:         Behavior: Behavior normal.         Thought Content:  Thought content normal.         Judgment: Judgment normal.         Vital Signs  ED Triage Vitals [11/24/23 2110]   Temperature Pulse Respirations Blood Pressure SpO2   97.6 °F (36.4 °C) 77 17 139/89 96 %      Temp Source Heart Rate Source Patient Position - Orthostatic VS BP Location FiO2 (%)   Temporal -- Lying Left arm --      Pain Score       No Pain           Vitals:    11/24/23 2110 11/24/23 2230   BP: 139/89    Pulse: 77 84   Patient Position - Orthostatic VS: Lying          Visual Acuity      ED Medications  Medications   albuterol inhalation solution 10 mg (10 mg Nebulization Given 11/24/23 2150)   ipratropium (ATROVENT) 0.02 % inhalation solution 1 mg (1 mg Nebulization Given 11/24/23 2147)   sodium chloride 0.9 % inhalation solution 12 mL (12 mL Nebulization Given 11/24/23 2147)   dexamethasone (DECADRON) tablet 10 mg (10 mg Oral Given 11/24/23 2136)       Diagnostic Studies  Results Reviewed       Procedure Component Value Units Date/Time    FLU/RSV/COVID - if FLU/RSV clinically relevant [[id number]]  (Normal) Collected: 11/24/23 2137    Lab Status: Final result Specimen: Nares from Nose Updated: 11/24/23 2231     SARS-CoV-2 Negative     INFLUENZA A PCR Negative     INFLUENZA B PCR Negative     RSV PCR Negative    Narrative:      FOR PEDIATRIC PATIENTS - copy/paste COVID Guidelines URL to browser: [email or web address]    SARS-CoV-2 assay is a Nucleic Acid Amplification assay intended for the  qualitative detection of nucleic acid from SARS-CoV-2 in nasopharyngeal  swabs. Results are for the presumptive identification of SARS-CoV-2 RNA. Positive results are indicative of infection with SARS-CoV-2, the virus  causing COVID-19, but do not rule out bacterial infection or co-infection  with other viruses. Laboratories within the [facility] and its  territories are required to report all positive results to the appropriate  public health authorities. Negative results do not preclude SARS-CoV-2  infection and should not be used as the sole basis for treatment or other  patient management decisions. Negative results must be combined with  clinical observations, patient history, and epidemiological information. This test has not been FDA cleared or approved. This test has been authorized by FDA under an Emergency Use Authorization  (EUA). This test is only authorized for the duration of time the  declaration that circumstances exist justifying the authorization of the  emergency use of an in vitro diagnostic tests for detection of SARS-CoV-2  virus and/or diagnosis of COVID-19 infection under section 564(b)(1) of  the Act, 21 U. S.C. 370IGA-4(I)(4), unless the authorization is terminated  or revoked sooner. The test has been validated but independent review by FDA  and CLIA is pending. Test performed using [employer] Gene[employer]pert: This RT-PCR assay targets N2,  a region unique to SARS-CoV-2. A conserved region in the E-gene was chosen  for pan-Sarbecovirus detection which includes SARS-CoV-2. According to CMS-2020-01-R, this platform meets the definition of high-throughput technology.     Comprehensive metabolic panel [[id number]]  (Abnormal) Collected: 11/24/23 2137    Lab Status: Final result Specimen: Blood from Arm, Left Updated: 11/24/23 2204     Sodium 138 mmol/L      Potassium 3.5 mmol/L      Chloride 102 mmol/L      CO2 28 mmol/L      ANION GAP 8 mmol/L      BUN 18 mg/dL      Creatinine 1.35 mg/dL      Glucose 112 mg/dL      Calcium 9.0 mg/dL      AST 25 U/L      ALT 23 U/L      Alkaline Phosphatase 43 U/L      Total Protein 7.1 g/dL      Albumin 4.3 g/dL      Total Bilirubin 0.35 mg/dL      eGFR 63 ml/min/1.73sq m     Narrative:      [place] guidelines for Chronic Kidney Disease (CKD):     Stage 1 with normal or high GFR (GFR > 90 mL/min/1.73 square meters)    Stage 2 Mild CKD (GFR = 60-89 mL/min/1.73 square meters)    Stage 3A Moderate CKD (GFR = 45-59 mL/min/1.73 square meters)    Stage 3B Moderate CKD (GFR = 30-44 mL/min/1.73 square meters)    Stage 4 Severe CKD (GFR = 15-29 mL/min/1.73 square meters)    Stage 5 End Stage CKD (GFR <15 mL/min/1.73 square meters)  Note: GFR calculation is accurate only with a steady state creatinine    CBC and differential [[id number]] Collected: 11/24/23 2137    Lab Status: Final result Specimen: Blood from Arm, Left Updated: 11/24/23 2142     WBC 7.52 Thousand/uL      RBC 4.86 Million/uL      Hemoglobin 14.5 g/dL      Hematocrit 44.2 %      MCV 91 fL      MCH 29.8 pg      MCHC 32.8 g/dL      RDW 11.9 %      MPV 10.8 fL      Platelets 650 Thousands/uL      nRBC 0 /100 WBCs      Neutrophils Relative 67 % Immat GRANS % 1 %      Lymphocytes Relative 18 %      Monocytes Relative 7 %      Eosinophils Relative 6 %      Basophils Relative 1 %      Neutrophils Absolute 5.13 Thousands/µL      Immature Grans Absolute 0.05 Thousand/uL      Lymphocytes Absolute 1.36 Thousands/µL      Monocytes Absolute 0.51 Thousand/µL      Eosinophils Absolute 0.42 Thousand/µL      Basophils Absolute 0.05 Thousands/µL                    XR chest 2 views    (Results Pending)              Procedures  Procedures         ED Course  ED Course as of 11/24/23 2244   Fri Nov 24, 2023 2217 XR chest 2 views  No acute cardiopulmonary changes   2240 Creatinine(!): 1.35   2240 SARS-COV-2: Negative   2240 INFLU A PCR: Negative   2240 INFLU B PCR: Negative   2240 Remains stable, breathing improved, notes feeling better and comfortable for close outpatient follow-up, spouse present and supportive, voices good understanding to return if worse or new symptoms                               SBIRT 20yo+      Flowsheet Row Most Recent Value   Initial Alcohol Screen: US AUDIT-C     1. How often do you have a drink containing alcohol? 2 Filed at: 11/24/2023 2112   2. How many drinks containing alcohol do you have on a typical day you are drinking? 1 Filed at: 11/24/2023 2112   3a. Male UNDER 65: How often do you have five or more drinks on one occasion? 0 Filed at: 11/24/2023 2112   3b. FEMALE Any Age, or MALE 65+: How often do you have 4 or more drinks on one occassion? 0 Filed at: 11/24/2023 2112   Audit-C Score 3 Filed at: 11/24/2023 2112   MADALYN: How many times in the past year have you. .. Used an illegal drug or used a prescription medication for non-medical reasons? Never Filed at: 11/24/2023 2112                      Medical Decision Making  Amount and/or Complexity of Data Reviewed  Independent Historian: spouse  Labs: ordered. Decision-making details documented in ED Course. Radiology: ordered and independent interpretation performed. Decision-making details documented in ED Course. ECG/medicine tests: ordered and independent interpretation performed. Decision-making details documented in ED Course. Risk  Prescription drug management. Disposition  Final diagnoses:   URI (upper respiratory infection)   Asthma exacerbation     Time reflects when diagnosis was documented in both MDM as applicable and the Disposition within this note       Time User Action Codes Description Comment    11/24/2023 10:18 PM Uvaldo Mckinney Add [J06.9] URI (upper respiratory infection)     11/24/2023 10:18 PM Uvaldo Mckinney Add [X83.169] Asthma exacerbation           ED Disposition       ED Disposition   Discharge    Condition   Stable    Date/Time   Fri Nov 24, 2023 2217    Comment   Marcus Seigfried discharge to home/self care.                    Follow-up Information       Follow up With Specialties Details Why [address] Nurse Practitioner Schedule an appointment as soon as possible for a visit in 3 days  [address] [place]  [phone]              Discharge Medication List as of 11/24/2023 10:20 PM        START taking these medications    Details   albuterol (2.5 mg/3 mL) 0.083 % nebulizer solution Take 3 mL (2.5 mg total) by nebulization every 4 (four) hours for 3 days and then as needed, Starting Fri 11/24/2023, Until Mon 11/27/2023, Normal      albuterol (PROVENTIL HFA,VENTOLIN HFA) 90 mcg/act inhaler 2-3 inhalations every 3-4 hours for 3 days and then as needed for cough, wheezing, Normal      predniSONE 10 mg tablet Take 5 tablets (50 mg total) by mouth daily for 5 days, Starting Fri 11/24/2023, Until Wed 11/29/2023, Normal             Outpatient Discharge Orders   Spacer Device for Inhaler       PDMP Review       None            ED Provider  Electronically Signed by             Uvaldo Mckinney, DO  11/24/23 [facility] [address], DO  11/24/23 6068

## 2023-11-25 NOTE — DISCHARGE INSTRUCTIONS
Albuterol inhaler 2-3 inhalations every 3-4 hours for 3 days and then as needed  Or albuterol vial 2.5 mg nebulized every 3-4 hours while awake  May use occasional albuterol inhaler inhalation in between treatments  Return immediately if worse or any new symptoms  Tylenol 1000 mg every 6 hours as needed  and/or  Advil 400 mg every 6 hours as needed  May take both together

## 2023-12-05 ENCOUNTER — HOSPITAL ENCOUNTER (EMERGENCY)
Facility: HOSPITAL | Age: 43
Discharge: HOME | End: 2023-12-05
Attending: EMERGENCY MEDICINE
Payer: COMMERCIAL

## 2023-12-05 ENCOUNTER — APPOINTMENT (EMERGENCY)
Dept: CT IMAGING | Facility: HOSPITAL | Age: 43
End: 2023-12-05
Payer: COMMERCIAL

## 2023-12-05 ENCOUNTER — APPOINTMENT (EMERGENCY)
Dept: RADIOLOGY | Facility: HOSPITAL | Age: 43
End: 2023-12-05
Payer: COMMERCIAL

## 2023-12-05 VITALS
HEART RATE: 82 BPM | SYSTOLIC BLOOD PRESSURE: 152 MMHG | DIASTOLIC BLOOD PRESSURE: 72 MMHG | TEMPERATURE: 98.2 F | RESPIRATION RATE: 18 BRPM | BODY MASS INDEX: 39.78 KG/M2 | OXYGEN SATURATION: 96 % | WEIGHT: 253.97 LBS

## 2023-12-05 DIAGNOSIS — R10.9 FLANK PAIN: Primary | ICD-10-CM

## 2023-12-05 DIAGNOSIS — S39.012A BACK STRAIN, INITIAL ENCOUNTER: ICD-10-CM

## 2023-12-05 LAB
ALBUMIN SERPL BCP-MCNC: 4.1 G/DL (ref 3.5–5)
ALP SERPL-CCNC: 47 U/L (ref 34–104)
ALT SERPL W P-5'-P-CCNC: 26 U/L (ref 7–52)
ANION GAP SERPL CALCULATED.3IONS-SCNC: 6 MMOL/L
AST SERPL W P-5'-P-CCNC: 21 U/L (ref 13–39)
ATRIAL RATE: 99 BPM
BASOPHILS # BLD AUTO: 0.03 THOUSANDS/ÂΜL (ref 0–0.1)
BASOPHILS NFR BLD AUTO: 0 % (ref 0–1)
BILIRUB SERPL-MCNC: 0.44 MG/DL (ref 0.2–1)
BILIRUB UR QL STRIP: NEGATIVE
BUN SERPL-MCNC: 27 MG/DL (ref 5–25)
CALCIUM SERPL-MCNC: 8.8 MG/DL (ref 8.4–10.2)
CARDIAC TROPONIN I PNL SERPL HS: 3 NG/L
CHLORIDE SERPL-SCNC: 102 MMOL/L (ref 96–108)
CK SERPL-CCNC: 220 U/L (ref 39–308)
CLARITY UR: CLEAR
CO2 SERPL-SCNC: 27 MMOL/L (ref 21–32)
COLOR UR: YELLOW
CREAT SERPL-MCNC: 1.05 MG/DL (ref 0.6–1.3)
D DIMER PPP FEU-MCNC: <0.27 UG/ML FEU
EOSINOPHIL # BLD AUTO: 0.22 THOUSAND/ÂΜL (ref 0–0.61)
EOSINOPHIL NFR BLD AUTO: 2 % (ref 0–6)
ERYTHROCYTE [DISTWIDTH] IN BLOOD BY AUTOMATED COUNT: 12.2 % (ref 11.6–15.1)
GFR SERPL CREATININE-BSD FRML MDRD: 86 ML/MIN/1.73SQ M
GLUCOSE SERPL-MCNC: 140 MG/DL (ref 65–140)
GLUCOSE UR STRIP-MCNC: NEGATIVE MG/DL
HCT VFR BLD AUTO: 46.6 % (ref 36.5–49.3)
HGB BLD-MCNC: 15.6 G/DL (ref 12–17)
HGB UR QL STRIP.AUTO: NEGATIVE
IMM GRANULOCYTES # BLD AUTO: 0.07 THOUSAND/UL (ref 0–0.2)
IMM GRANULOCYTES NFR BLD AUTO: 1 % (ref 0–2)
KETONES UR STRIP-MCNC: NEGATIVE MG/DL
LEUKOCYTE ESTERASE UR QL STRIP: NEGATIVE
LIPASE SERPL-CCNC: 49 U/L (ref 11–82)
LYMPHOCYTES # BLD AUTO: 0.86 THOUSANDS/ÂΜL (ref 0.6–4.47)
LYMPHOCYTES NFR BLD AUTO: 9 % (ref 14–44)
MAGNESIUM SERPL-MCNC: 2 MG/DL (ref 1.9–2.7)
MCH RBC QN AUTO: 30.1 PG (ref 26.8–34.3)
MCHC RBC AUTO-ENTMCNC: 33.5 G/DL (ref 31.4–37.4)
MCV RBC AUTO: 90 FL (ref 82–98)
MONOCYTES # BLD AUTO: 0.53 THOUSAND/ÂΜL (ref 0.17–1.22)
MONOCYTES NFR BLD AUTO: 5 % (ref 4–12)
NEUTROPHILS # BLD AUTO: 8.02 THOUSANDS/ÂΜL (ref 1.85–7.62)
NEUTS SEG NFR BLD AUTO: 83 % (ref 43–75)
NITRITE UR QL STRIP: NEGATIVE
NRBC BLD AUTO-RTO: 0 /100 WBCS
P AXIS: 19 DEGREES
PH UR STRIP.AUTO: 7 [PH]
PLATELET # BLD AUTO: 196 THOUSANDS/UL (ref 149–390)
PMV BLD AUTO: 10.6 FL (ref 8.9–12.7)
POTASSIUM SERPL-SCNC: 4.1 MMOL/L (ref 3.5–5.3)
PR INTERVAL: 136 MS
PROT SERPL-MCNC: 6.6 G/DL (ref 6.4–8.4)
PROT UR STRIP-MCNC: NEGATIVE MG/DL
QRS AXIS: 1 DEGREES
QRSD INTERVAL: 106 MS
QT INTERVAL: 352 MS
QTC INTERVAL: 451 MS
RBC # BLD AUTO: 5.19 MILLION/UL (ref 3.88–5.62)
SODIUM SERPL-SCNC: 135 MMOL/L (ref 135–147)
SP GR UR STRIP.AUTO: 1.02 (ref 1–1.03)
T WAVE AXIS: 32 DEGREES
UROBILINOGEN UR QL STRIP.AUTO: 1 E.U./DL
VENTRICULAR RATE: 99 BPM
WBC # BLD AUTO: 9.73 THOUSAND/UL (ref 4.31–10.16)

## 2023-12-05 PROCEDURE — 83690 ASSAY OF LIPASE: CPT | Performed by: EMERGENCY MEDICINE

## 2023-12-05 PROCEDURE — 99284 EMERGENCY DEPT VISIT MOD MDM: CPT

## 2023-12-05 PROCEDURE — 74176 CT ABD & PELVIS W/O CONTRAST: CPT

## 2023-12-05 PROCEDURE — G1004 CDSM NDSC: HCPCS

## 2023-12-05 PROCEDURE — 99285 EMERGENCY DEPT VISIT HI MDM: CPT | Performed by: EMERGENCY MEDICINE

## 2023-12-05 PROCEDURE — 81003 URINALYSIS AUTO W/O SCOPE: CPT | Performed by: EMERGENCY MEDICINE

## 2023-12-05 PROCEDURE — 80053 COMPREHEN METABOLIC PANEL: CPT | Performed by: EMERGENCY MEDICINE

## 2023-12-05 PROCEDURE — 93005 ELECTROCARDIOGRAM TRACING: CPT

## 2023-12-05 PROCEDURE — 85379 FIBRIN DEGRADATION QUANT: CPT | Performed by: EMERGENCY MEDICINE

## 2023-12-05 PROCEDURE — 85025 COMPLETE CBC W/AUTO DIFF WBC: CPT | Performed by: EMERGENCY MEDICINE

## 2023-12-05 PROCEDURE — 96374 THER/PROPH/DIAG INJ IV PUSH: CPT

## 2023-12-05 PROCEDURE — 83735 ASSAY OF MAGNESIUM: CPT | Performed by: EMERGENCY MEDICINE

## 2023-12-05 PROCEDURE — 71045 X-RAY EXAM CHEST 1 VIEW: CPT

## 2023-12-05 PROCEDURE — 82550 ASSAY OF CK (CPK): CPT | Performed by: EMERGENCY MEDICINE

## 2023-12-05 PROCEDURE — 36415 COLL VENOUS BLD VENIPUNCTURE: CPT | Performed by: EMERGENCY MEDICINE

## 2023-12-05 PROCEDURE — 84484 ASSAY OF TROPONIN QUANT: CPT | Performed by: EMERGENCY MEDICINE

## 2023-12-05 PROCEDURE — 96361 HYDRATE IV INFUSION ADD-ON: CPT

## 2023-12-05 RX ORDER — PREDNISONE 20 MG/1
40 TABLET ORAL DAILY
Qty: 10 TABLET | Refills: 0 | Status: SHIPPED | OUTPATIENT
Start: 2023-12-05 | End: 2023-12-10

## 2023-12-05 RX ORDER — FLUTICASONE PROPIONATE 50 MCG
1 SPRAY, SUSPENSION (ML) NASAL 2 TIMES DAILY
COMMUNITY

## 2023-12-05 RX ORDER — CYCLOBENZAPRINE HCL 10 MG
10 TABLET ORAL ONCE
Status: COMPLETED | OUTPATIENT
Start: 2023-12-05 | End: 2023-12-05

## 2023-12-05 RX ORDER — LIDOCAINE 50 MG/G
1 PATCH TOPICAL ONCE
Status: DISCONTINUED | OUTPATIENT
Start: 2023-12-05 | End: 2023-12-05 | Stop reason: HOSPADM

## 2023-12-05 RX ORDER — LIDOCAINE 50 MG/G
1 PATCH TOPICAL DAILY
Qty: 7 PATCH | Refills: 0 | Status: SHIPPED | OUTPATIENT
Start: 2023-12-05 | End: 2023-12-12

## 2023-12-05 RX ORDER — PREDNISONE 20 MG/1
40 TABLET ORAL ONCE
Status: COMPLETED | OUTPATIENT
Start: 2023-12-05 | End: 2023-12-05

## 2023-12-05 RX ORDER — CYCLOBENZAPRINE HCL 10 MG
10 TABLET ORAL 2 TIMES DAILY PRN
Qty: 20 TABLET | Refills: 0 | Status: SHIPPED | OUTPATIENT
Start: 2023-12-05

## 2023-12-05 RX ORDER — MONTELUKAST SODIUM 10 MG/1
1 TABLET ORAL DAILY
COMMUNITY
Start: 2023-09-11

## 2023-12-05 RX ORDER — NAPROXEN 500 MG/1
500 TABLET ORAL 2 TIMES DAILY WITH MEALS
Qty: 30 TABLET | Refills: 0 | Status: SHIPPED | OUTPATIENT
Start: 2023-12-05

## 2023-12-05 RX ORDER — VALSARTAN 80 MG/1
1 TABLET ORAL DAILY
COMMUNITY
Start: 2023-06-09

## 2023-12-05 RX ORDER — LEVOCETIRIZINE DIHYDROCHLORIDE 5 MG/1
5 TABLET, FILM COATED ORAL
COMMUNITY
Start: 2023-11-27

## 2023-12-05 RX ORDER — KETOROLAC TROMETHAMINE 30 MG/ML
15 INJECTION, SOLUTION INTRAMUSCULAR; INTRAVENOUS ONCE
Status: COMPLETED | OUTPATIENT
Start: 2023-12-05 | End: 2023-12-05

## 2023-12-05 RX ORDER — BUDESONIDE AND FORMOTEROL FUMARATE DIHYDRATE 160; 4.5 UG/1; UG/1
2 AEROSOL RESPIRATORY (INHALATION) 2 TIMES DAILY
COMMUNITY

## 2023-12-05 RX ADMIN — SODIUM CHLORIDE 1000 ML: 0.9 INJECTION, SOLUTION INTRAVENOUS at 19:06

## 2023-12-05 RX ADMIN — PREDNISONE 40 MG: 20 TABLET ORAL at 20:14

## 2023-12-05 RX ADMIN — CYCLOBENZAPRINE 10 MG: 10 TABLET, FILM COATED ORAL at 20:14

## 2023-12-05 RX ADMIN — KETOROLAC TROMETHAMINE 15 MG: 30 INJECTION, SOLUTION INTRAMUSCULAR; INTRAVENOUS at 18:55

## 2023-12-05 RX ADMIN — LIDOCAINE 5% 1 PATCH: 700 PATCH TOPICAL at 20:14

## 2023-12-05 NOTE — Clinical Note
Teodoro Van was seen and treated in our emergency department on 12/5/2023. Diagnosis:     Adebayo Benson  may return to work on return date. He may return on this date: 12/08/2023         If you have any questions or concerns, please don't hesitate to call.       Patt Hernandez MD    ______________________________           _______________          _______________  Hospital Representative                              Date                                Time

## 2023-12-05 NOTE — ED PROVIDER NOTES
History  Chief Complaint   Patient presents with    Flank Pain     C/o bilateral flank pain radiating into lower abdomen x4 days; last BM today     Patient complains of bilateral flank pain that radiates to the lower abdomen for the past 3 days constantly. Worse with movement. No dysuria or frequency. Last bowel movement was today and normal.  No chest pain. No shortness of breath. No known trauma. No history of kidney stones. Nothing taken for symptoms. Worse with certain positions. Eating and drinking well. No change with eating. History provided by:  Patient   used: No    Flank Pain  Pain location:  L flank and R flank  Pain quality: aching    Pain radiates to:  Does not radiate  Pain severity:  Moderate  Onset quality:  Gradual  Duration:  3 days  Timing:  Constant  Progression:  Worsening  Chronicity:  New  Context: recent illness    Context: not awakening from sleep, not diet changes, not recent sexual activity, not sick contacts and not suspicious food intake    Relieved by:  Nothing  Worsened by: Movement  Ineffective treatments:  None tried  Associated symptoms: no anorexia, no chest pain, no chills, no constipation, no cough, no diarrhea, no dysuria, no fever, no flatus, no hematuria, no nausea, no shortness of breath, no sore throat and no vomiting        Prior to Admission Medications   Prescriptions Last Dose Informant Patient Reported? Taking?    albuterol (PROVENTIL HFA,VENTOLIN HFA) 90 mcg/act inhaler   No No   Si-3 inhalations every 3-4 hours for 3 days and then as needed for cough, wheezing   budesonide-formoterol (SYMBICORT) 160-4.5 mcg/act inhaler 2023  Yes Yes   Sig: Inhale 2 puffs 2 (two) times a day   fluticasone (FLONASE) 50 mcg/act nasal spray 2023  Yes Yes   Si spray into each nostril 2 (two) times a day   levocetirizine (XYZAL) 5 MG tablet 2023  Yes Yes   Sig: Take 5 mg by mouth   montelukast (SINGULAIR) 10 mg tablet 2023  Yes Yes   Sig: Take 1 tablet by mouth daily   valsartan (DIOVAN) 80 mg tablet 12/4/2023  Yes Yes   Sig: Take 1 tablet by mouth daily      Facility-Administered Medications: None       Past Medical History:   Diagnosis Date    Asthma        History reviewed. No pertinent surgical history. History reviewed. No pertinent family history. I have reviewed and agree with the history as documented. E-Cigarette/Vaping    E-Cigarette Use Never User      E-Cigarette/Vaping Substances     Social History     Tobacco Use    Smoking status: Never    Smokeless tobacco: Never   Vaping Use    Vaping Use: Never used   Substance Use Topics    Alcohol use: Yes    Drug use: Never       Review of Systems   Constitutional:  Negative for chills and fever. HENT:  Negative for ear pain, hearing loss, sore throat, trouble swallowing and voice change. Eyes:  Negative for pain and discharge. Respiratory:  Negative for cough, shortness of breath and wheezing. Cardiovascular:  Negative for chest pain and palpitations. Gastrointestinal:  Negative for abdominal pain, anorexia, blood in stool, constipation, diarrhea, flatus, nausea and vomiting. Genitourinary:  Positive for flank pain. Negative for dysuria, frequency and hematuria. Musculoskeletal:  Negative for joint swelling, neck pain and neck stiffness. Skin:  Negative for rash and wound. Neurological:  Negative for dizziness, seizures, syncope, facial asymmetry and headaches. Psychiatric/Behavioral:  Negative for hallucinations, self-injury and suicidal ideas. All other systems reviewed and are negative. Physical Exam  Physical Exam  Vitals and nursing note reviewed. Constitutional:       General: He is not in acute distress. Appearance: He is well-developed. HENT:      Head: Normocephalic and atraumatic. Right Ear: External ear normal.      Left Ear: External ear normal.   Eyes:      General: No scleral icterus. Right eye: No discharge. Left eye: No discharge. Extraocular Movements: Extraocular movements intact. Conjunctiva/sclera: Conjunctivae normal.   Cardiovascular:      Rate and Rhythm: Normal rate and regular rhythm. Heart sounds: Normal heart sounds. No murmur heard. Pulmonary:      Effort: Pulmonary effort is normal.      Breath sounds: Normal breath sounds. No wheezing or rales. Abdominal:      General: Bowel sounds are normal. There is no distension. Palpations: Abdomen is soft. Tenderness: There is no abdominal tenderness. There is no guarding or rebound. Musculoskeletal:         General: No deformity. Normal range of motion. Cervical back: Normal range of motion and neck supple. Skin:     General: Skin is warm and dry. Findings: No rash. Neurological:      General: No focal deficit present. Mental Status: He is alert and oriented to person, place, and time. Cranial Nerves: No cranial nerve deficit. Psychiatric:         Mood and Affect: Mood normal.         Behavior: Behavior normal.         Thought Content:  Thought content normal.         Judgment: Judgment normal.         Vital Signs  ED Triage Vitals [12/05/23 1838]   Temperature Pulse Respirations Blood Pressure SpO2   98.2 °F (36.8 °C) 91 18 144/90 95 %      Temp Source Heart Rate Source Patient Position - Orthostatic VS BP Location FiO2 (%)   Temporal Monitor Sitting Left arm --      Pain Score       2           Vitals:    12/05/23 1838 12/05/23 1845 12/05/23 1930   BP: 144/90 144/90 130/60   Pulse: 91 93 81   Patient Position - Orthostatic VS: Sitting  Sitting         Visual Acuity      ED Medications  Medications   predniSONE tablet 40 mg (has no administration in time range)   cyclobenzaprine (FLEXERIL) tablet 10 mg (has no administration in time range)   lidocaine (LIDODERM) 5 % patch 1 patch (has no administration in time range)   sodium chloride 0.9 % bolus 1,000 mL (1,000 mL Intravenous New Bag 12/5/23 1904) ketorolac (TORADOL) injection 15 mg (15 mg Intravenous Given 12/5/23 1855)       Diagnostic Studies  Results Reviewed       Procedure Component Value Units Date/Time    UA w Reflex to Microscopic w Reflex to Culture [[id number]] Collected: 12/05/23 1939    Lab Status: Final result Specimen: Urine, Clean Catch Updated: 12/05/23 1955     Color, UA Yellow     Clarity, UA Clear     Specific Gravity, UA 1.025     pH, UA 7.0     Leukocytes, UA Negative     Nitrite, UA Negative     Protein, UA Negative mg/dl      Glucose, UA Negative mg/dl      Ketones, UA Negative mg/dl      Urobilinogen, UA 1.0 E.U./dl      Bilirubin, UA Negative     Occult Blood, UA Negative    D-Dimer [[id number]]  (Normal) Collected: 12/05/23 1853    Lab Status: Final result Specimen: Blood from Arm, Right Updated: 12/05/23 1934     D-Dimer, Quant <0.27 ug/ml FEU     HS Troponin 0hr (reflex protocol) [[id number]]  (Normal) Collected: 12/05/23 1853    Lab Status: Final result Specimen: Blood from Arm, Right Updated: 12/05/23 1925     hs TnI 0hr 3 ng/L     Comprehensive metabolic panel [[id number]]  (Abnormal) Collected: 12/05/23 1853    Lab Status: Final result Specimen: Blood from Arm, Right Updated: 12/05/23 1920     Sodium 135 mmol/L      Potassium 4.1 mmol/L      Chloride 102 mmol/L      CO2 27 mmol/L      ANION GAP 6 mmol/L      BUN 27 mg/dL      Creatinine 1.05 mg/dL      Glucose 140 mg/dL      Calcium 8.8 mg/dL      AST 21 U/L      ALT 26 U/L      Alkaline Phosphatase 47 U/L      Total Protein 6.6 g/dL      Albumin 4.1 g/dL      Total Bilirubin 0.44 mg/dL      eGFR 86 ml/min/1.73sq m     Narrative:      [place] guidelines for Chronic Kidney Disease (CKD):     Stage 1 with normal or high GFR (GFR > 90 mL/min/1.73 square meters)    Stage 2 Mild CKD (GFR = 60-89 mL/min/1.73 square meters)    Stage 3A Moderate CKD (GFR = 45-59 mL/min/1.73 square meters)    Stage 3B Moderate CKD (GFR = 30-44 mL/min/1.73 square meters) Stage 4 Severe CKD (GFR = 15-29 mL/min/1.73 square meters)    Stage 5 End Stage CKD (GFR <15 mL/min/1.73 square meters)  Note: GFR calculation is accurate only with a steady state creatinine    Lipase [[id number]]  (Normal) Collected: 12/05/23 1853    Lab Status: Final result Specimen: Blood from Arm, Right Updated: 12/05/23 1920     Lipase 49 u/L     CK [[id number]]  (Normal) Collected: 12/05/23 1853    Lab Status: Final result Specimen: Blood from Arm, Right Updated: 12/05/23 1920     Total  U/L     Magnesium [[id number]]  (Normal) Collected: 12/05/23 1853    Lab Status: Final result Specimen: Blood from Arm, Right Updated: 12/05/23 1920     Magnesium 2.0 mg/dL     CBC and differential [[id number]]  (Abnormal) Collected: 12/05/23 1853    Lab Status: Final result Specimen: Blood from Arm, Right Updated: 12/05/23 1900     WBC 9.73 Thousand/uL      RBC 5.19 Million/uL      Hemoglobin 15.6 g/dL      Hematocrit 46.6 %      MCV 90 fL      MCH 30.1 pg      MCHC 33.5 g/dL      RDW 12.2 %      MPV 10.6 fL      Platelets 923 Thousands/uL      nRBC 0 /100 WBCs      Neutrophils Relative 83 %      Immat GRANS % 1 %      Lymphocytes Relative 9 %      Monocytes Relative 5 %      Eosinophils Relative 2 %      Basophils Relative 0 %      Neutrophils Absolute 8.02 Thousands/µL      Immature Grans Absolute 0.07 Thousand/uL      Lymphocytes Absolute 0.86 Thousands/µL      Monocytes Absolute 0.53 Thousand/µL      Eosinophils Absolute 0.22 Thousand/µL      Basophils Absolute 0.03 Thousands/µL                    XR chest 1 view portable   ED Interpretation by Patt Hernandez MD (12/05 1921)   NAD      CT abdomen pelvis wo contrast   Final Result by Traci Molina MD (12/05 2002)      No acute inflammatory process identified in the abdomen or pelvis. Enlarged fatty liver.             Workstation performed: [id number]                    Procedures  ECG 12 Lead Documentation Only    Date/Time: 12/5/2023 6:56 PM    Performed by: Arpita Edwards MD  Authorized by: Arpita Edwards MD    ECG reviewed by me, the ED Provider: yes    Patient location:  ED  Rate:     ECG rate:  100  Rhythm:     Rhythm: sinus rhythm    Ectopy:     Ectopy: none    QRS:     QRS axis:  Normal           ED Course  ED Course as of 12/05/23 2008   Tue Dec 05, 2023   1938 D-Dimer, Quant: <0.27   1938 hs TnI 0hr: 3   2001 Blood work and CAT scan are fairly unremarkable. Pain is reproducible with movement. Most likely musculoskeletal in origin. Patient has had a cough recently which could have provoked the strain in his back. SBIRT 20yo+      Flowsheet Row Most Recent Value   Initial Alcohol Screen: US AUDIT-C     1. How often do you have a drink containing alcohol? 0 Filed at: 12/05/2023 1840   2. How many drinks containing alcohol do you have on a typical day you are drinking? 0 Filed at: 12/05/2023 1840   3a. Male UNDER 65: How often do you have five or more drinks on one occasion? 0 Filed at: 12/05/2023 1840   Audit-C Score 0 Filed at: 12/05/2023 1840   MADALYN: How many times in the past year have you. .. Used an illegal drug or used a prescription medication for non-medical reasons? Never Filed at: 12/05/2023 1840                      Medical Decision Making  Amount and/or Complexity of Data Reviewed  Labs: ordered. Decision-making details documented in ED Course. Radiology: ordered and independent interpretation performed. Decision-making details documented in ED Course. ECG/medicine tests: ordered and independent interpretation performed. Decision-making details documented in ED Course.   Discussion of management or test interpretation with external provider(s): Differential diagnosis includes but not limited to STEMI, NSTEMI, cholelithiasis, cholecystitis, peptic ulcer disease, gastritis, pancreatitis, diverticulitis, colitis, bowel obstruction, appendicitis, UTI, ureteral stone, kidney stone, inflammatory bowel disease, back strain    Risk  Prescription drug management. Disposition  Final diagnoses:   Flank pain   Back strain, initial encounter     Time reflects when diagnosis was documented in both MDM as applicable and the Disposition within this note       Time User Action Codes Description Comment    12/5/2023  8:06 PM Lawshawnancpamela Guerriern Add [R10.9] Flank pain     12/5/2023  8:06 PM Beronica Arenas Add [S39.012A] Back strain, initial encounter           ED Disposition       ED Disposition   Discharge    Condition   Stable    Date/Time   Tue Dec 5, 2023 2007    Comment   Marcus Seigfried discharge to home/self care. Follow-up Information       Follow up With Specialties Details Why Contact Info    ELISABETH Scott Nurse Practitioner Call in 1 day  [facility] [address]  [phone]              Patient's Medications   Discharge Prescriptions    CYCLOBENZAPRINE (FLEXERIL) 10 MG TABLET    Take 1 tablet (10 mg total) by mouth 2 (two) times a day as needed for muscle spasms       Start Date: 12/5/2023 End Date: --       Order Dose: 10 mg       Quantity: 20 tablet    Refills: 0    LIDOCAINE (LIDODERM) 5 %    Apply 1 patch topically over 12 hours daily for 7 days Apply to area of pain. Remove & Discard patch within 12 hours or as directed by MD       Start Date: 12/5/2023 End Date: 12/12/2023       Order Dose: 1 patch       Quantity: 7 patch    Refills: 0    NAPROXEN (NAPROSYN) 500 MG TABLET    Take 1 tablet (500 mg total) by mouth 2 (two) times a day with meals       Start Date: 12/5/2023 End Date: --       Order Dose: 500 mg       Quantity: 30 tablet    Refills: 0    PREDNISONE 20 MG TABLET    Take 2 tablets (40 mg total) by mouth daily for 5 days       Start Date: 12/5/2023 End Date: 12/10/2023       Order Dose: 40 mg       Quantity: 10 tablet    Refills: 0       No discharge procedures on file.     PDMP Review       None            ED Provider  Electronically Signed by Helene Benavides MD  12/05/23 2008

## 2023-12-28 ENCOUNTER — HOSPITAL ENCOUNTER (EMERGENCY)
Facility: HOSPITAL | Age: 43
Discharge: HOME | End: 2023-12-29
Attending: STUDENT IN AN ORGANIZED HEALTH CARE EDUCATION/TRAINING PROGRAM
Payer: COMMERCIAL

## 2023-12-28 VITALS
SYSTOLIC BLOOD PRESSURE: 161 MMHG | TEMPERATURE: 97.9 F | OXYGEN SATURATION: 96 % | HEART RATE: 95 BPM | HEIGHT: 67 IN | BODY MASS INDEX: 36.1 KG/M2 | RESPIRATION RATE: 18 BRPM | DIASTOLIC BLOOD PRESSURE: 97 MMHG | WEIGHT: 230 LBS

## 2023-12-28 DIAGNOSIS — B34.9 VIRAL SYNDROME: Primary | ICD-10-CM

## 2023-12-29 LAB
FLUAV RNA RESP QL NAA+PROBE: POSITIVE
FLUBV RNA RESP QL NAA+PROBE: NEGATIVE
RSV RNA RESP QL NAA+PROBE: NEGATIVE
SARS-COV-2 RNA RESP QL NAA+PROBE: NEGATIVE

## 2023-12-29 PROCEDURE — 99284 EMERGENCY DEPT VISIT MOD MDM: CPT | Performed by: STUDENT IN AN ORGANIZED HEALTH CARE EDUCATION/TRAINING PROGRAM

## 2023-12-29 PROCEDURE — 0241U HB NFCT DS VIR RESP RNA 4 TRGT: CPT | Performed by: STUDENT IN AN ORGANIZED HEALTH CARE EDUCATION/TRAINING PROGRAM

## 2023-12-29 NOTE — DISCHARGE INSTRUCTIONS
You will be notified with the results of the respiratory viral panel.    For fever/body aches/headache, you can take ibuprofen 600 mg every 6 hours and Tylenol 1000 mg every 6 hours.    Drink plenty of clear/hydrating fluids over the next few days.  For cough, you can take Robitussin DM.  Please take as directed.    Return to the emergency department for any concerning signs or symptoms.

## 2023-12-29 NOTE — ED PROVIDER NOTES
History  Chief Complaint   Patient presents with    Fever     Sneezing, cough, fevers since yesterday. Pt took unknown abx today.        History provided by:  Patient  Fever  Severity:  Moderate  Onset quality:  Gradual  Duration:  1 day  Timing:  Constant  Progression:  Worsening  Chronicity:  New  Context:  Flu-like symptoms x 1 day. T max 99 F. Eating/drinking, voiding well. Did not receive the flu/covid vaccines this year. Mulitple sick contacts.  Relieved by:  Motrin/Tylenol  Worsened by:  Nothing  Associated symptoms: congestion, cough, fatigue, fever and rhinorrhea    Associated symptoms: no abdominal pain, no chest pain, no diarrhea, no headaches, no myalgias, no nausea, no rash, no shortness of breath, no sore throat, no vomiting and no wheezing      Prior to Admission Medications   Prescriptions Last Dose Informant Patient Reported? Taking?   albuterol (PROVENTIL HFA,VENTOLIN HFA) 90 mcg/act inhaler   No No   Si-3 inhalations every 3-4 hours for 3 days and then as needed for cough, wheezing   budesonide-formoterol (SYMBICORT) 160-4.5 mcg/act inhaler   Yes No   Sig: Inhale 2 puffs 2 (two) times a day   cyclobenzaprine (FLEXERIL) 10 mg tablet   No No   Sig: Take 1 tablet (10 mg total) by mouth 2 (two) times a day as needed for muscle spasms   fluticasone (FLONASE) 50 mcg/act nasal spray   Yes No   Si spray into each nostril 2 (two) times a day   levocetirizine (XYZAL) 5 MG tablet   Yes No   Sig: Take 5 mg by mouth   lidocaine (Lidoderm) 5 %   No No   Sig: Apply 1 patch topically over 12 hours daily for 7 days Apply to area of pain.  Remove & Discard patch within 12 hours or as directed by MD   montelukast (SINGULAIR) 10 mg tablet   Yes No   Sig: Take 1 tablet by mouth daily   naproxen (Naprosyn) 500 mg tablet   No No   Sig: Take 1 tablet (500 mg total) by mouth 2 (two) times a day with meals   valsartan (DIOVAN) 80 mg tablet   Yes No   Sig: Take 1 tablet by mouth daily      Facility-Administered  Medications: None       Past Medical History:   Diagnosis Date    Asthma        History reviewed. No pertinent surgical history.    History reviewed. No pertinent family history.  I have reviewed and agree with the history as documented.    E-Cigarette/Vaping    E-Cigarette Use Never User      E-Cigarette/Vaping Substances     Social History     Tobacco Use    Smoking status: Never    Smokeless tobacco: Never   Vaping Use    Vaping status: Never Used   Substance Use Topics    Alcohol use: Yes    Drug use: Never       Review of Systems   Constitutional:  Positive for activity change, fatigue and fever. Negative for appetite change.   HENT:  Positive for congestion, rhinorrhea, sinus pressure and sinus pain. Negative for sore throat.    Eyes:  Negative for pain, discharge, redness and itching.   Respiratory:  Positive for cough. Negative for shortness of breath and wheezing.    Cardiovascular:  Negative for chest pain and palpitations.   Gastrointestinal:  Negative for abdominal pain, diarrhea, nausea and vomiting.   Genitourinary:  Negative for decreased urine volume, difficulty urinating, dysuria, flank pain and urgency.   Musculoskeletal:  Positive for arthralgias. Negative for myalgias, neck pain and neck stiffness.   Skin:  Negative for color change, pallor, rash and wound.   Allergic/Immunologic: Negative for immunocompromised state.   Neurological:  Negative for dizziness, syncope, weakness, light-headedness and headaches.   All other systems reviewed and are negative.      Physical Exam  Physical Exam  Vitals and nursing note reviewed.   Constitutional:       General: He is not in acute distress.     Appearance: He is not ill-appearing or toxic-appearing.   HENT:      Head: Normocephalic and atraumatic.      Right Ear: Tympanic membrane, ear canal and external ear normal.      Left Ear: Tympanic membrane, ear canal and external ear normal.      Nose: Congestion and rhinorrhea present.      Mouth/Throat:       Mouth: Mucous membranes are moist.      Pharynx: Posterior oropharyngeal erythema present. No oropharyngeal exudate.   Cardiovascular:      Rate and Rhythm: Normal rate and regular rhythm.      Pulses: Normal pulses.      Heart sounds: Normal heart sounds. No murmur heard.  Pulmonary:      Effort: Pulmonary effort is normal. No respiratory distress.      Breath sounds: Normal breath sounds. No stridor. No wheezing, rhonchi or rales.   Chest:      Chest wall: No tenderness.   Abdominal:      General: Bowel sounds are normal.      Palpations: Abdomen is soft.      Tenderness: There is no abdominal tenderness. There is no guarding or rebound.   Musculoskeletal:      Cervical back: Neck supple. No tenderness.   Lymphadenopathy:      Cervical: No cervical adenopathy.   Skin:     General: Skin is warm and dry.      Capillary Refill: Capillary refill takes less than 2 seconds.      Coloration: Skin is not jaundiced or pale.      Findings: No bruising, erythema, lesion or rash.   Neurological:      General: No focal deficit present.      Mental Status: He is alert and oriented to person, place, and time.   Psychiatric:         Mood and Affect: Mood normal.         Behavior: Behavior normal.         Vital Signs  ED Triage Vitals [12/28/23 2320]   Temperature Pulse Respirations Blood Pressure SpO2   97.9 °F (36.6 °C) 95 18 161/97 96 %      Temp Source Heart Rate Source Patient Position - Orthostatic VS BP Location FiO2 (%)   Temporal Monitor Sitting Left arm --      Pain Score       --           Vitals:    12/28/23 2320   BP: 161/97   Pulse: 95   Patient Position - Orthostatic VS: Sitting         Visual Acuity      ED Medications  Medications - No data to display    Diagnostic Studies  Results Reviewed       Procedure Component Value Units Date/Time    FLU/RSV/COVID - if FLU/RSV clinically relevant [[id number]] Collected: 12/29/23 0002    Lab Status: In process Specimen: Nares from Nose Updated: 12/29/23 0006                    No orders to display              Procedures  Procedures         ED Course                                             Medical Decision Making  The differential diagnoses include but are not limited to influenza, COVID, viral illness, pneumonia, bronchitis, group A strep pharyngitis  Vital signs reviewed.  No signs of distress.  Lung exam unremarkable.  No wheezing.  The patient states that he has been eating/drinking/voiding well.  Appears hydrated.  Likely viral illness.  Respiratory viral panel is pending.  The patient will be contacted with the results.  Recommendations/return precautions were discussed.  All questions addressed.  Stable for discharge.    Problems Addressed:  Viral syndrome: acute illness or injury    Amount and/or Complexity of Data Reviewed  Labs: ordered.             Disposition  Final diagnoses:   Viral syndrome     Time reflects when diagnosis was documented in both MDM as applicable and the Disposition within this note       Time User Action Codes Description Comment    12/29/2023 12:36 AM Bernabe Tarango [B34.9] Viral syndrome           ED Disposition       ED Disposition   Discharge    Condition   Stable    Date/Time   Fri Dec 29, 2023 12:36 AM    Comment   Marcus Seigfried discharge to home/self care.                   Follow-up Information    None         Discharge Medication List as of 12/29/2023 12:37 AM        CONTINUE these medications which have NOT CHANGED    Details   albuterol (PROVENTIL HFA,VENTOLIN HFA) 90 mcg/act inhaler 2-3 inhalations every 3-4 hours for 3 days and then as needed for cough, wheezing, Normal      budesonide-formoterol (SYMBICORT) 160-4.5 mcg/act inhaler Inhale 2 puffs 2 (two) times a day, Historical Med      cyclobenzaprine (FLEXERIL) 10 mg tablet Take 1 tablet (10 mg total) by mouth 2 (two) times a day as needed for muscle spasms, Starting Tue 12/5/2023, Normal      fluticasone (FLONASE) 50 mcg/act nasal spray 1 spray into each nostril 2 (two)  times a day, Historical Med      levocetirizine (XYZAL) 5 MG tablet Take 5 mg by mouth, Starting Mon 11/27/2023, Historical Med      lidocaine (Lidoderm) 5 % Apply 1 patch topically over 12 hours daily for 7 days Apply to area of pain.  Remove & Discard patch within 12 hours or as directed by MD, Starting Tue 12/5/2023, Until Tue 12/12/2023, Normal      montelukast (SINGULAIR) 10 mg tablet Take 1 tablet by mouth daily, Starting Mon 9/11/2023, Historical Med      naproxen (Naprosyn) 500 mg tablet Take 1 tablet (500 mg total) by mouth 2 (two) times a day with meals, Starting Tue 12/5/2023, Normal      valsartan (DIOVAN) 80 mg tablet Take 1 tablet by mouth daily, Starting Fri 6/9/2023, Historical Med             No discharge procedures on file.    PDMP Review       None            ED Provider  Electronically Signed by             Bernabe Tarango,   12/29/23 0049       Bernabe Tarango,   12/29/23 0052

## 2023-12-30 ENCOUNTER — APPOINTMENT (OUTPATIENT)
Dept: RADIOLOGY | Facility: HOSPITAL | Age: 43
End: 2023-12-30
Payer: COMMERCIAL

## 2023-12-30 ENCOUNTER — HOSPITAL ENCOUNTER (EMERGENCY)
Facility: HOSPITAL | Age: 43
Discharge: HOME | End: 2023-12-30
Attending: EMERGENCY MEDICINE
Payer: COMMERCIAL

## 2023-12-30 VITALS
HEART RATE: 84 BPM | RESPIRATION RATE: 17 BRPM | SYSTOLIC BLOOD PRESSURE: 142 MMHG | WEIGHT: 230 LBS | DIASTOLIC BLOOD PRESSURE: 100 MMHG | OXYGEN SATURATION: 95 % | TEMPERATURE: 97.9 F | HEIGHT: 67 IN | BODY MASS INDEX: 36.1 KG/M2

## 2023-12-30 DIAGNOSIS — J11.1 INFLUENZA: Primary | ICD-10-CM

## 2023-12-30 LAB
ALBUMIN SERPL BCP-MCNC: 4.2 G/DL (ref 3.5–5)
ALP SERPL-CCNC: 40 U/L (ref 34–104)
ALT SERPL W P-5'-P-CCNC: 22 U/L (ref 7–52)
ANION GAP SERPL CALCULATED.3IONS-SCNC: 8 MMOL/L
AST SERPL W P-5'-P-CCNC: 25 U/L (ref 13–39)
BASE EX.OXY STD BLDV CALC-SCNC: 68.4 % (ref 60–80)
BASE EXCESS BLDV CALC-SCNC: 2.3 MMOL/L
BASOPHILS # BLD AUTO: 0.01 THOUSANDS/ÂΜL (ref 0–0.1)
BASOPHILS NFR BLD AUTO: 0 % (ref 0–1)
BILIRUB SERPL-MCNC: 0.33 MG/DL (ref 0.2–1)
BUN SERPL-MCNC: 17 MG/DL (ref 5–25)
CALCIUM SERPL-MCNC: 8.8 MG/DL (ref 8.4–10.2)
CHLORIDE SERPL-SCNC: 101 MMOL/L (ref 96–108)
CO2 SERPL-SCNC: 29 MMOL/L (ref 21–32)
CREAT SERPL-MCNC: 1.08 MG/DL (ref 0.6–1.3)
EOSINOPHIL # BLD AUTO: 0.08 THOUSAND/ÂΜL (ref 0–0.61)
EOSINOPHIL NFR BLD AUTO: 2 % (ref 0–6)
ERYTHROCYTE [DISTWIDTH] IN BLOOD BY AUTOMATED COUNT: 12.7 % (ref 11.6–15.1)
FLUAV RNA RESP QL NAA+PROBE: POSITIVE
FLUBV RNA RESP QL NAA+PROBE: NEGATIVE
GFR SERPL CREATININE-BSD FRML MDRD: 83 ML/MIN/1.73SQ M
GLUCOSE SERPL-MCNC: 115 MG/DL (ref 65–140)
HCO3 BLDV-SCNC: 29.2 MMOL/L (ref 24–30)
HCT VFR BLD AUTO: 45.1 % (ref 36.5–49.3)
HGB BLD-MCNC: 14.2 G/DL (ref 12–17)
IMM GRANULOCYTES # BLD AUTO: 0.02 THOUSAND/UL (ref 0–0.2)
IMM GRANULOCYTES NFR BLD AUTO: 0 % (ref 0–2)
LYMPHOCYTES # BLD AUTO: 0.36 THOUSANDS/ÂΜL (ref 0.6–4.47)
LYMPHOCYTES NFR BLD AUTO: 7 % (ref 14–44)
MCH RBC QN AUTO: 30 PG (ref 26.8–34.3)
MCHC RBC AUTO-ENTMCNC: 31.5 G/DL (ref 31.4–37.4)
MCV RBC AUTO: 95 FL (ref 82–98)
MONOCYTES # BLD AUTO: 0.53 THOUSAND/ÂΜL (ref 0.17–1.22)
MONOCYTES NFR BLD AUTO: 10 % (ref 4–12)
NEUTROPHILS # BLD AUTO: 4.28 THOUSANDS/ÂΜL (ref 1.85–7.62)
NEUTS SEG NFR BLD AUTO: 81 % (ref 43–75)
NRBC BLD AUTO-RTO: 0 /100 WBCS
O2 CT BLDV-SCNC: 14.4 ML/DL
PCO2 BLDV: 53.8 MM HG (ref 42–50)
PH BLDV: 7.35 [PH] (ref 7.3–7.4)
PLATELET # BLD AUTO: 179 THOUSANDS/UL (ref 149–390)
PMV BLD AUTO: 10 FL (ref 8.9–12.7)
PO2 BLDV: 38.3 MM HG (ref 35–45)
POTASSIUM SERPL-SCNC: 3.8 MMOL/L (ref 3.5–5.3)
PROT SERPL-MCNC: 7 G/DL (ref 6.4–8.4)
RBC # BLD AUTO: 4.74 MILLION/UL (ref 3.88–5.62)
RSV RNA RESP QL NAA+PROBE: NEGATIVE
SARS-COV-2 RNA RESP QL NAA+PROBE: NEGATIVE
SODIUM SERPL-SCNC: 138 MMOL/L (ref 135–147)
WBC # BLD AUTO: 5.28 THOUSAND/UL (ref 4.31–10.16)

## 2023-12-30 PROCEDURE — 80053 COMPREHEN METABOLIC PANEL: CPT | Performed by: EMERGENCY MEDICINE

## 2023-12-30 PROCEDURE — 93005 ELECTROCARDIOGRAM TRACING: CPT

## 2023-12-30 PROCEDURE — 99284 EMERGENCY DEPT VISIT MOD MDM: CPT | Performed by: PHYSICIAN ASSISTANT

## 2023-12-30 PROCEDURE — 0241U HB NFCT DS VIR RESP RNA 4 TRGT: CPT | Performed by: EMERGENCY MEDICINE

## 2023-12-30 PROCEDURE — 71046 X-RAY EXAM CHEST 2 VIEWS: CPT

## 2023-12-30 PROCEDURE — 85025 COMPLETE CBC W/AUTO DIFF WBC: CPT | Performed by: EMERGENCY MEDICINE

## 2023-12-30 PROCEDURE — 82805 BLOOD GASES W/O2 SATURATION: CPT | Performed by: EMERGENCY MEDICINE

## 2023-12-30 PROCEDURE — 36415 COLL VENOUS BLD VENIPUNCTURE: CPT

## 2023-12-30 PROCEDURE — 99284 EMERGENCY DEPT VISIT MOD MDM: CPT

## 2023-12-30 RX ORDER — METHYLPREDNISOLONE 4 MG/1
TABLET ORAL
Qty: 21 TABLET | Refills: 0 | Status: SHIPPED | OUTPATIENT
Start: 2023-12-30

## 2023-12-30 RX ORDER — IBUPROFEN 400 MG/1
800 TABLET ORAL ONCE
Status: COMPLETED | OUTPATIENT
Start: 2023-12-30 | End: 2023-12-30

## 2023-12-30 RX ORDER — ALBUTEROL SULFATE 2.5 MG/3ML
2.5 SOLUTION RESPIRATORY (INHALATION) EVERY 6 HOURS PRN
Qty: 75 ML | Refills: 0 | Status: SHIPPED | OUTPATIENT
Start: 2023-12-30

## 2023-12-30 RX ADMIN — IBUPROFEN 800 MG: 400 TABLET, FILM COATED ORAL at 11:10

## 2023-12-30 NOTE — ED PROVIDER NOTES
History  Chief Complaint   Patient presents with    Flu Symptoms     Diagnosis two days ago and not feeling better. Pt with a history of asthma      43-year-old male presents the emergency department for evaluation of URI symptoms.  Patient reports subjective fevers and chills, generalized bodyaches, cough and congestion.  States cough is currently nonproductive.  Reports history of asthma states he has been checking his oxygen saturations at home which have been in the low to mid 90s.  Patient has ambulated treatment at home.  Last took Tylenol approximately 2 hours ago.  Reports family members with influenza.  Patient denies any pain or shortness of breath.        Prior to Admission Medications   Prescriptions Last Dose Informant Patient Reported? Taking?   albuterol (PROVENTIL HFA,VENTOLIN HFA) 90 mcg/act inhaler   No No   Si-3 inhalations every 3-4 hours for 3 days and then as needed for cough, wheezing   budesonide-formoterol (SYMBICORT) 160-4.5 mcg/act inhaler   Yes No   Sig: Inhale 2 puffs 2 (two) times a day   cyclobenzaprine (FLEXERIL) 10 mg tablet   No No   Sig: Take 1 tablet (10 mg total) by mouth 2 (two) times a day as needed for muscle spasms   fluticasone (FLONASE) 50 mcg/act nasal spray   Yes No   Si spray into each nostril 2 (two) times a day   levocetirizine (XYZAL) 5 MG tablet   Yes No   Sig: Take 5 mg by mouth   lidocaine (Lidoderm) 5 %   No No   Sig: Apply 1 patch topically over 12 hours daily for 7 days Apply to area of pain.  Remove & Discard patch within 12 hours or as directed by MD   montelukast (SINGULAIR) 10 mg tablet   Yes No   Sig: Take 1 tablet by mouth daily   naproxen (Naprosyn) 500 mg tablet   No No   Sig: Take 1 tablet (500 mg total) by mouth 2 (two) times a day with meals   valsartan (DIOVAN) 80 mg tablet   Yes No   Sig: Take 1 tablet by mouth daily      Facility-Administered Medications: None       Past Medical History:   Diagnosis Date    Asthma        History reviewed.  No pertinent surgical history.    History reviewed. No pertinent family history.  I have reviewed and agree with the history as documented.    E-Cigarette/Vaping    E-Cigarette Use Never User      E-Cigarette/Vaping Substances     Social History     Tobacco Use    Smoking status: Never    Smokeless tobacco: Never   Vaping Use    Vaping status: Never Used   Substance Use Topics    Alcohol use: Yes    Drug use: Never       Review of Systems   Constitutional:  Positive for appetite change, chills, fatigue and fever.   HENT:  Positive for congestion.    Respiratory:  Positive for cough. Negative for shortness of breath, wheezing and stridor.    Cardiovascular:  Negative for chest pain, palpitations and leg swelling.   Gastrointestinal: Negative.    Musculoskeletal:  Positive for myalgias.   Skin:  Negative for rash.   Neurological: Negative.    All other systems reviewed and are negative.      Physical Exam  Physical Exam  Vitals and nursing note reviewed.   Constitutional:       General: He is not in acute distress.     Appearance: Normal appearance. He is not ill-appearing, toxic-appearing or diaphoretic.   HENT:      Head: Normocephalic and atraumatic.      Nose: Nose normal.   Eyes:      Conjunctiva/sclera: Conjunctivae normal.   Cardiovascular:      Rate and Rhythm: Normal rate and regular rhythm.   Pulmonary:      Effort: Pulmonary effort is normal.      Breath sounds: Rhonchi present. No wheezing or rales.   Abdominal:      General: Abdomen is flat. There is no distension.      Palpations: Abdomen is soft.      Tenderness: There is no abdominal tenderness.   Musculoskeletal:         General: Normal range of motion.   Skin:     General: Skin is warm and dry.      Findings: No erythema or rash.   Neurological:      General: No focal deficit present.      Mental Status: He is alert and oriented to person, place, and time.   Psychiatric:         Mood and Affect: Mood normal.         Vital Signs  ED Triage Vitals    Temperature Pulse Respirations Blood Pressure SpO2   12/30/23 0930 12/30/23 0930 12/30/23 0930 12/30/23 0930 12/30/23 0930   (!) 97 °F (36.1 °C) 94 17 137/96 95 %      Temp Source Heart Rate Source Patient Position - Orthostatic VS BP Location FiO2 (%)   12/30/23 1120 12/30/23 0930 12/30/23 1120 12/30/23 1120 --   Temporal Monitor Lying Left arm       Pain Score       12/30/23 0930       No Pain           Vitals:    12/30/23 0930 12/30/23 1120   BP: 137/96 142/100   Pulse: 94 84   Patient Position - Orthostatic VS:  Lying         Visual Acuity      ED Medications  Medications   ibuprofen (MOTRIN) tablet 800 mg (800 mg Oral Given 12/30/23 1110)       Diagnostic Studies  Results Reviewed       Procedure Component Value Units Date/Time    FLU/RSV/COVID - if FLU/RSV clinically relevant [[id number]]  (Abnormal) Collected: 12/30/23 1017    Lab Status: Final result Specimen: Nares from Nose Updated: 12/30/23 1103     SARS-CoV-2 Negative     INFLUENZA A PCR Positive     INFLUENZA B PCR Negative     RSV PCR Negative    Narrative:      FOR PEDIATRIC PATIENTS - copy/paste COVID Guidelines URL to browser: [email or web address]    SARS-CoV-2 assay is a Nucleic Acid Amplification assay intended for the  qualitative detection of nucleic acid from SARS-CoV-2 in nasopharyngeal  swabs. Results are for the presumptive identification of SARS-CoV-2 RNA.    Positive results are indicative of infection with SARS-CoV-2, the virus  causing COVID-19, but do not rule out bacterial infection or co-infection  with other viruses. Laboratories within the [place] and its  territories are required to report all positive results to the appropriate  public health authorities. Negative results do not preclude SARS-CoV-2  infection and should not be used as the sole basis for treatment or other  patient management decisions. Negative results must be combined with  clinical observations,  patient history, and epidemiological information.  This test has not been FDA cleared or approved.    This test has been authorized by FDA under an Emergency Use Authorization  (EUA). This test is only authorized for the duration of time the  declaration that circumstances exist justifying the authorization of the  emergency use of an in vitro diagnostic tests for detection of SARS-CoV-2  virus and/or diagnosis of COVID-19 infection under section 564(b)(1) of  the Act, 21 U.S.C. 360bbb-3(b)(1), unless the authorization is terminated  or revoked sooner. The test has been validated but independent review by FDA  and CLIA is pending.    Test performed using [employer] GeneXpert: This RT-PCR assay targets N2,  a region unique to SARS-CoV-2. A conserved region in the E-gene was chosen  for pan-Sarbecovirus detection which includes SARS-CoV-2.    According to CMS-2020-01-R, this platform meets the definition of high-throughput technology.    Comprehensive metabolic panel [[id number]] Collected: 12/30/23 1017    Lab Status: Final result Specimen: Blood from Arm, Left Updated: 12/30/23 1049     Sodium 138 mmol/L      Potassium 3.8 mmol/L      Chloride 101 mmol/L      CO2 29 mmol/L      ANION GAP 8 mmol/L      BUN 17 mg/dL      Creatinine 1.08 mg/dL      Glucose 115 mg/dL      Calcium 8.8 mg/dL      AST 25 U/L      ALT 22 U/L      Alkaline Phosphatase 40 U/L      Total Protein 7.0 g/dL      Albumin 4.2 g/dL      Total Bilirubin 0.33 mg/dL      eGFR 83 ml/min/1.73sq m     Narrative:      National Kidney Disease Foundation guidelines for Chronic Kidney Disease (CKD):     Stage 1 with normal or high GFR (GFR > 90 mL/min/1.73 square meters)    Stage 2 Mild CKD (GFR = 60-89 mL/min/1.73 square meters)    Stage 3A Moderate CKD (GFR = 45-59 mL/min/1.73 square meters)    Stage 3B Moderate CKD (GFR = 30-44 mL/min/1.73 square meters)    Stage 4 Severe CKD (GFR = 15-29 mL/min/1.73 square meters)    Stage 5 End Stage CKD (GFR <15 mL/min/1.73  square meters)  Note: GFR calculation is accurate only with a steady state creatinine    Blood gas, venous [[id number]]  (Abnormal) Collected: 12/30/23 1017    Lab Status: Final result Specimen: Blood from Arm, Left Updated: 12/30/23 1032     pH, Pipo 7.352     pCO2, Pipo 53.8 mm Hg      pO2, Pipo 38.3 mm Hg      HCO3, Pipo 29.2 mmol/L      Base Excess, Pipo 2.3 mmol/L      O2 Content, Pipo 14.4 ml/dL      O2 HGB, VENOUS 68.4 %     CBC and differential [[id number]]  (Abnormal) Collected: 12/30/23 1017    Lab Status: Final result Specimen: Blood from Arm, Left Updated: 12/30/23 1027     WBC 5.28 Thousand/uL      RBC 4.74 Million/uL      Hemoglobin 14.2 g/dL      Hematocrit 45.1 %      MCV 95 fL      MCH 30.0 pg      MCHC 31.5 g/dL      RDW 12.7 %      MPV 10.0 fL      Platelets 179 Thousands/uL      nRBC 0 /100 WBCs      Neutrophils Relative 81 %      Immat GRANS % 0 %      Lymphocytes Relative 7 %      Monocytes Relative 10 %      Eosinophils Relative 2 %      Basophils Relative 0 %      Neutrophils Absolute 4.28 Thousands/µL      Immature Grans Absolute 0.02 Thousand/uL      Lymphocytes Absolute 0.36 Thousands/µL      Monocytes Absolute 0.53 Thousand/µL      Eosinophils Absolute 0.08 Thousand/µL      Basophils Absolute 0.01 Thousands/µL                    XR chest 2 views    (Results Pending)              Procedures  Procedures         ED Course  ED Course as of 12/30/23 1130   Sat Dec 30, 2023   1112 INFLU A PCR(!): Positive                               SBIRT 20yo+      Flowsheet Row Most Recent Value   Initial Alcohol Screen: US AUDIT-C     1. How often do you have a drink containing alcohol? 0 Filed at: 12/30/2023 0933   2. How many drinks containing alcohol do you have on a typical day you are drinking?  0 Filed at: 12/30/2023 0933   3a. Male UNDER 65: How often do you have five or more drinks on one occasion? 0 Filed at: 12/30/2023 0933   Audit-C Score 0 Filed at: 12/30/2023 0933   MADALYN: How many times in the past  year have you...    Used an illegal drug or used a prescription medication for non-medical reasons? Never Filed at: 12/30/2023 0933                      Medical Decision Making  43-year-old male presents to the emergency department for evaluation of flulike symptoms.  Vitals and medical record reviewed.  Patient at risk for the following but not limited to COVID, flu, RSV, pneumonia.  Patient found to be flu positive.  ED interpretation of chest x-ray negative for acute cardiopulmonary findings.  Was prescribed Medrol Dosepak and represcribed albuterol nebulizer treatments.  We discussed symptomatic treatment at home return precautions and appropriate follow-up and patient verbalized understanding.  He was clinically and hemodynamically stable for discharge    Problems Addressed:  Influenza: acute illness or injury    Amount and/or Complexity of Data Reviewed  Labs: ordered. Decision-making details documented in ED Course.  Radiology: ordered.    Risk  Prescription drug management.             Disposition  Final diagnoses:   Influenza     Time reflects when diagnosis was documented in both MDM as applicable and the Disposition within this note       Time User Action Codes Description Comment    12/30/2023 11:14 AM Sidra Adame Add [J11.1] Influenza           ED Disposition       ED Disposition   Discharge    Condition   Stable    Date/Time   Sat Dec 30, 2023 1114    Comment   Marcus Seigfried discharge to home/self care.                   Follow-up Information       Follow up With Specialties Details Why Contact Info    ELISABETH Salas Nurse Practitioner   [address] [facility] [address] [phone]              Discharge Medication List as of 12/30/2023 11:22 AM        START taking these medications    Details   albuterol (2.5 mg/3 mL) 0.083 % nebulizer solution Take 3 mL (2.5 mg total) by nebulization every 6 (six) hours as needed for wheezing or shortness of breath, Starting Sat 12/30/2023,  Normal      methylPREDNISolone 4 MG tablet therapy pack Use as directed on package, Normal           CONTINUE these medications which have NOT CHANGED    Details   albuterol (PROVENTIL HFA,VENTOLIN HFA) 90 mcg/act inhaler 2-3 inhalations every 3-4 hours for 3 days and then as needed for cough, wheezing, Normal      budesonide-formoterol (SYMBICORT) 160-4.5 mcg/act inhaler Inhale 2 puffs 2 (two) times a day, Historical Med      cyclobenzaprine (FLEXERIL) 10 mg tablet Take 1 tablet (10 mg total) by mouth 2 (two) times a day as needed for muscle spasms, Starting Tue 12/5/2023, Normal      fluticasone (FLONASE) 50 mcg/act nasal spray 1 spray into each nostril 2 (two) times a day, Historical Med      levocetirizine (XYZAL) 5 MG tablet Take 5 mg by mouth, Starting Mon 11/27/2023, Historical Med      lidocaine (Lidoderm) 5 % Apply 1 patch topically over 12 hours daily for 7 days Apply to area of pain.  Remove & Discard patch within 12 hours or as directed by MD, Starting Tue 12/5/2023, Until Tue 12/12/2023, Normal      montelukast (SINGULAIR) 10 mg tablet Take 1 tablet by mouth daily, Starting Mon 9/11/2023, Historical Med      naproxen (Naprosyn) 500 mg tablet Take 1 tablet (500 mg total) by mouth 2 (two) times a day with meals, Starting Tue 12/5/2023, Normal      valsartan (DIOVAN) 80 mg tablet Take 1 tablet by mouth daily, Starting Fri 6/9/2023, Historical Med             No discharge procedures on file.    PDMP Review       None            ED Provider  Electronically Signed by             Sidra Adame PA-C  12/30/23 0646

## 2023-12-30 NOTE — Clinical Note
Marcus Cortes was seen and treated in our emergency department on 12/30/2023.    ?    ?    ?    Diagnosis: ?    Marcus  ?.    He may return on this date: 01/04/2024    ?     If you have any questions or concerns, please don't hesitate to call.      Sidra Adame PA-C    ______________________________           _______________          _______________  Hospital Representative                              Date                                Time

## 2023-12-30 NOTE — DISCHARGE INSTRUCTIONS
Please continue with good symptomatic care, stay well rested.  Alternate between Tylenol and ibuprofen.  Stay well-hydrated.  Please follow-up with your family doctor and return with any new or worsening symptoms.  Continue to use your inhaler at home.  Continue to check your pulse ox. Please wear a mask in the public setting help prevent the spread of the flu  Your x-ray was reviewed today in the emergency department and there was no obvious abnormalities found, however, the imaging will be reviewed by the radiologist later this evening or the following day and if there is any abnormalities found you will get a phone call with those results.

## 2024-01-01 LAB
ATRIAL RATE: 87 BPM
P AXIS: 64 DEGREES
PR INTERVAL: 142 MS
QRS AXIS: 47 DEGREES
QRSD INTERVAL: 106 MS
QT INTERVAL: 366 MS
QTC INTERVAL: 440 MS
T WAVE AXIS: 59 DEGREES
VENTRICULAR RATE: 87 BPM

## 2024-06-08 ENCOUNTER — HOSPITAL ENCOUNTER (EMERGENCY)
Facility: HOSPITAL | Age: 44
Discharge: HOME | End: 2024-06-08
Attending: EMERGENCY MEDICINE
Payer: COMMERCIAL

## 2024-06-08 VITALS
RESPIRATION RATE: 18 BRPM | SYSTOLIC BLOOD PRESSURE: 140 MMHG | TEMPERATURE: 100.4 F | WEIGHT: 238 LBS | HEIGHT: 67 IN | OXYGEN SATURATION: 97 % | HEART RATE: 90 BPM | BODY MASS INDEX: 37.35 KG/M2 | DIASTOLIC BLOOD PRESSURE: 90 MMHG

## 2024-06-08 DIAGNOSIS — R50.9 FEVER: Primary | ICD-10-CM

## 2024-06-08 LAB
2HR DELTA HS TROPONIN: 9 NG/L
ALBUMIN SERPL BCP-MCNC: 4 G/DL (ref 3.5–5)
ALP SERPL-CCNC: 39 U/L (ref 34–104)
ALT SERPL W P-5'-P-CCNC: 34 U/L (ref 7–52)
ANION GAP SERPL CALCULATED.3IONS-SCNC: 6 MMOL/L (ref 4–13)
AST SERPL W P-5'-P-CCNC: 34 U/L (ref 13–39)
BASOPHILS # BLD AUTO: 0.01 THOUSANDS/ÂΜL (ref 0–0.1)
BASOPHILS NFR BLD AUTO: 0 % (ref 0–1)
BILIRUB SERPL-MCNC: 0.46 MG/DL (ref 0.2–1)
BUN SERPL-MCNC: 19 MG/DL (ref 5–25)
CALCIUM SERPL-MCNC: 8.7 MG/DL (ref 8.4–10.2)
CARDIAC TROPONIN I PNL SERPL HS: 24 NG/L
CARDIAC TROPONIN I PNL SERPL HS: 33 NG/L
CHLORIDE SERPL-SCNC: 103 MMOL/L (ref 96–108)
CK SERPL-CCNC: 391 U/L (ref 39–308)
CO2 SERPL-SCNC: 29 MMOL/L (ref 21–32)
CREAT SERPL-MCNC: 1.15 MG/DL (ref 0.6–1.3)
EOSINOPHIL # BLD AUTO: 0.01 THOUSAND/ÂΜL (ref 0–0.61)
EOSINOPHIL NFR BLD AUTO: 0 % (ref 0–6)
ERYTHROCYTE [DISTWIDTH] IN BLOOD BY AUTOMATED COUNT: 12.1 % (ref 11.6–15.1)
FLUAV RNA RESP QL NAA+PROBE: NEGATIVE
FLUBV RNA RESP QL NAA+PROBE: NEGATIVE
GFR SERPL CREATININE-BSD FRML MDRD: 77 ML/MIN/1.73SQ M
GLUCOSE SERPL-MCNC: 125 MG/DL (ref 65–140)
HCT VFR BLD AUTO: 43.2 % (ref 36.5–49.3)
HGB BLD-MCNC: 14.2 G/DL (ref 12–17)
IMM GRANULOCYTES # BLD AUTO: 0.03 THOUSAND/UL (ref 0–0.2)
IMM GRANULOCYTES NFR BLD AUTO: 1 % (ref 0–2)
LIPASE SERPL-CCNC: 33 U/L (ref 11–82)
LYMPHOCYTES # BLD AUTO: 0.26 THOUSANDS/ÂΜL (ref 0.6–4.47)
LYMPHOCYTES NFR BLD AUTO: 10 % (ref 14–44)
MAGNESIUM SERPL-MCNC: 2 MG/DL (ref 1.9–2.7)
MCH RBC QN AUTO: 29.3 PG (ref 26.8–34.3)
MCHC RBC AUTO-ENTMCNC: 32.9 G/DL (ref 31.4–37.4)
MCV RBC AUTO: 89 FL (ref 82–98)
MONOCYTES # BLD AUTO: 0.27 THOUSAND/ÂΜL (ref 0.17–1.22)
MONOCYTES NFR BLD AUTO: 10 % (ref 4–12)
NEUTROPHILS # BLD AUTO: 2.1 THOUSANDS/ÂΜL (ref 1.85–7.62)
NEUTS SEG NFR BLD AUTO: 79 % (ref 43–75)
NRBC BLD AUTO-RTO: 0 /100 WBCS
PLATELET # BLD AUTO: 125 THOUSANDS/UL (ref 149–390)
PMV BLD AUTO: 10.5 FL (ref 8.9–12.7)
POTASSIUM SERPL-SCNC: 3.9 MMOL/L (ref 3.5–5.3)
PROT SERPL-MCNC: 6.5 G/DL (ref 6.4–8.4)
RBC # BLD AUTO: 4.84 MILLION/UL (ref 3.88–5.62)
RSV RNA RESP QL NAA+PROBE: NEGATIVE
SARS-COV-2 RNA RESP QL NAA+PROBE: NEGATIVE
SODIUM SERPL-SCNC: 138 MMOL/L (ref 135–147)
WBC # BLD AUTO: 2.68 THOUSAND/UL (ref 4.31–10.16)

## 2024-06-08 PROCEDURE — 87040 BLOOD CULTURE FOR BACTERIA: CPT | Performed by: EMERGENCY MEDICINE

## 2024-06-08 PROCEDURE — 87468 ANAPLSMA PHGCYTOPHLM AMP PRB: CPT | Performed by: EMERGENCY MEDICINE

## 2024-06-08 PROCEDURE — 96361 HYDRATE IV INFUSION ADD-ON: CPT

## 2024-06-08 PROCEDURE — 0241U HB NFCT DS VIR RESP RNA 4 TRGT: CPT | Performed by: EMERGENCY MEDICINE

## 2024-06-08 PROCEDURE — 83690 ASSAY OF LIPASE: CPT | Performed by: EMERGENCY MEDICINE

## 2024-06-08 PROCEDURE — 82550 ASSAY OF CK (CPK): CPT | Performed by: EMERGENCY MEDICINE

## 2024-06-08 PROCEDURE — 96374 THER/PROPH/DIAG INJ IV PUSH: CPT

## 2024-06-08 PROCEDURE — 86666 EHRLICHIA ANTIBODY: CPT | Performed by: EMERGENCY MEDICINE

## 2024-06-08 PROCEDURE — 85025 COMPLETE CBC W/AUTO DIFF WBC: CPT | Performed by: EMERGENCY MEDICINE

## 2024-06-08 PROCEDURE — 86618 LYME DISEASE ANTIBODY: CPT | Performed by: EMERGENCY MEDICINE

## 2024-06-08 PROCEDURE — 99285 EMERGENCY DEPT VISIT HI MDM: CPT | Performed by: EMERGENCY MEDICINE

## 2024-06-08 PROCEDURE — 86308 HETEROPHILE ANTIBODY SCREEN: CPT | Performed by: EMERGENCY MEDICINE

## 2024-06-08 PROCEDURE — 93005 ELECTROCARDIOGRAM TRACING: CPT

## 2024-06-08 PROCEDURE — 84484 ASSAY OF TROPONIN QUANT: CPT | Performed by: EMERGENCY MEDICINE

## 2024-06-08 PROCEDURE — 83735 ASSAY OF MAGNESIUM: CPT | Performed by: EMERGENCY MEDICINE

## 2024-06-08 PROCEDURE — 36415 COLL VENOUS BLD VENIPUNCTURE: CPT | Performed by: EMERGENCY MEDICINE

## 2024-06-08 PROCEDURE — 99283 EMERGENCY DEPT VISIT LOW MDM: CPT

## 2024-06-08 PROCEDURE — 80053 COMPREHEN METABOLIC PANEL: CPT | Performed by: EMERGENCY MEDICINE

## 2024-06-08 RX ORDER — DOXYCYCLINE HYCLATE 100 MG/1
100 CAPSULE ORAL ONCE
Status: COMPLETED | OUTPATIENT
Start: 2024-06-08 | End: 2024-06-08

## 2024-06-08 RX ORDER — ACETAMINOPHEN 325 MG/1
650 TABLET ORAL ONCE
Status: COMPLETED | OUTPATIENT
Start: 2024-06-08 | End: 2024-06-08

## 2024-06-08 RX ORDER — KETOROLAC TROMETHAMINE 30 MG/ML
15 INJECTION, SOLUTION INTRAMUSCULAR; INTRAVENOUS ONCE
Status: COMPLETED | OUTPATIENT
Start: 2024-06-08 | End: 2024-06-08

## 2024-06-08 RX ORDER — DOXYCYCLINE HYCLATE 100 MG/1
100 CAPSULE ORAL 2 TIMES DAILY
Qty: 20 CAPSULE | Refills: 0 | Status: SHIPPED | OUTPATIENT
Start: 2024-06-08 | End: 2024-06-18

## 2024-06-08 RX ADMIN — DOXYCYCLINE 100 MG: 100 CAPSULE ORAL at 15:44

## 2024-06-08 RX ADMIN — KETOROLAC TROMETHAMINE 15 MG: 30 INJECTION, SOLUTION INTRAMUSCULAR at 15:17

## 2024-06-08 RX ADMIN — ACETAMINOPHEN 325MG 650 MG: 325 TABLET ORAL at 15:17

## 2024-06-08 RX ADMIN — SODIUM CHLORIDE 1000 ML: 0.9 INJECTION, SOLUTION INTRAVENOUS at 13:12

## 2024-06-08 RX ADMIN — SODIUM CHLORIDE 500 ML: 0.9 INJECTION, SOLUTION INTRAVENOUS at 15:17

## 2024-06-08 NOTE — DISCHARGE INSTRUCTIONS
We also did blood work looking for tickborne illnesses such as Lyme disease, ehrlichiosis, Anaplasma.  Those results are pending.  We will call you with the results if they are positive.  You can also look them up at the [facility].  In the meantime we are starting you on antibiotics that would help treat these illnesses.

## 2024-06-08 NOTE — ED PROVIDER NOTES
History  Chief Complaint   Patient presents with    Cold Like Symptoms     Pt arrives from home c/o body aches, fatigue, headache and chills since Wednesday.      Patient is not been feeling well for the past 3 days.  Fatigue, body aches, headache.  No nausea or vomiting.  No diarrhea.  Has had tick bites recently.  No rash.  No fevers or chills.      History provided by:  Patient   used: No    Flu Symptoms  Presenting symptoms: fatigue, headache and myalgias    Presenting symptoms: no cough, no diarrhea, no fever, no nausea, no shortness of breath, no sore throat and no vomiting    Severity:  Mild  Onset quality:  Gradual  Duration:  3 days  Progression:  Waxing and waning  Chronicity:  New  Relieved by:  Nothing  Worsened by:  Nothing  Ineffective treatments:  None tried  Associated symptoms: no chills, no ear pain, no congestion and no neck stiffness        Prior to Admission Medications   Prescriptions Last Dose Informant Patient Reported? Taking?   albuterol (2.5 mg/3 mL) 0.083 % nebulizer solution   No No   Sig: Take 3 mL (2.5 mg total) by nebulization every 6 (six) hours as needed for wheezing or shortness of breath   albuterol (PROVENTIL HFA,VENTOLIN HFA) 90 mcg/act inhaler   No No   Si-3 inhalations every 3-4 hours for 3 days and then as needed for cough, wheezing   budesonide-formoterol (SYMBICORT) 160-4.5 mcg/act inhaler   Yes No   Sig: Inhale 2 puffs 2 (two) times a day   cyclobenzaprine (FLEXERIL) 10 mg tablet   No No   Sig: Take 1 tablet (10 mg total) by mouth 2 (two) times a day as needed for muscle spasms   fluticasone (FLONASE) 50 mcg/act nasal spray   Yes No   Si spray into each nostril 2 (two) times a day   levocetirizine (XYZAL) 5 MG tablet   Yes No   Sig: Take 5 mg by mouth   lidocaine (Lidoderm) 5 %   No No   Sig: Apply 1 patch topically over 12 hours daily for 7 days Apply to area of pain.  Remove & Discard patch within 12 hours or as directed by MD    methylPREDNISolone 4 MG tablet therapy pack   No No   Sig: Use as directed on package   montelukast (SINGULAIR) 10 mg tablet   Yes No   Sig: Take 1 tablet by mouth daily   naproxen (Naprosyn) 500 mg tablet   No No   Sig: Take 1 tablet (500 mg total) by mouth 2 (two) times a day with meals   valsartan (DIOVAN) 80 mg tablet   Yes No   Sig: Take 1 tablet by mouth daily      Facility-Administered Medications: None       Past Medical History:   Diagnosis Date    Asthma        History reviewed. No pertinent surgical history.    History reviewed. No pertinent family history.  I have reviewed and agree with the history as documented.    E-Cigarette/Vaping    E-Cigarette Use Never User      E-Cigarette/Vaping Substances     Social History     Tobacco Use    Smoking status: Never    Smokeless tobacco: Never   Vaping Use    Vaping status: Never Used   Substance Use Topics    Alcohol use: Yes    Drug use: Never       Review of Systems   Constitutional:  Positive for fatigue. Negative for chills and fever.   HENT:  Negative for congestion, ear pain, hearing loss, sore throat, trouble swallowing and voice change.    Eyes:  Negative for pain and discharge.   Respiratory:  Negative for cough, shortness of breath and wheezing.    Cardiovascular:  Negative for chest pain and palpitations.   Gastrointestinal:  Negative for abdominal pain, blood in stool, constipation, diarrhea, nausea and vomiting.   Genitourinary:  Negative for dysuria, flank pain, frequency and hematuria.   Musculoskeletal:  Positive for myalgias. Negative for joint swelling, neck pain and neck stiffness.   Skin:  Negative for rash and wound.   Neurological:  Positive for headaches. Negative for dizziness, seizures, syncope and facial asymmetry.   Psychiatric/Behavioral:  Negative for hallucinations, self-injury and suicidal ideas.    All other systems reviewed and are negative.      Physical Exam  Physical Exam  Vitals and nursing note reviewed.   Constitutional:        General: He is not in acute distress.     Appearance: He is well-developed.   HENT:      Head: Normocephalic and atraumatic.      Right Ear: External ear normal.      Left Ear: External ear normal.   Eyes:      General: No scleral icterus.        Right eye: No discharge.         Left eye: No discharge.      Extraocular Movements: Extraocular movements intact.      Conjunctiva/sclera: Conjunctivae normal.   Cardiovascular:      Rate and Rhythm: Normal rate and regular rhythm.      Heart sounds: Normal heart sounds. No murmur heard.  Pulmonary:      Effort: Pulmonary effort is normal.      Breath sounds: Normal breath sounds. No wheezing or rales.   Abdominal:      General: Bowel sounds are normal. There is no distension.      Palpations: Abdomen is soft.      Tenderness: There is no abdominal tenderness. There is no guarding or rebound.   Musculoskeletal:         General: No deformity. Normal range of motion.      Cervical back: Normal range of motion and neck supple.   Skin:     General: Skin is warm and dry.      Findings: No rash.   Neurological:      General: No focal deficit present.      Mental Status: He is alert and oriented to person, place, and time.      Cranial Nerves: No cranial nerve deficit.   Psychiatric:         Mood and Affect: Mood normal.         Behavior: Behavior normal.         Thought Content: Thought content normal.         Judgment: Judgment normal.         Vital Signs  ED Triage Vitals [06/08/24 1243]   Temperature Pulse Respirations Blood Pressure SpO2   98.8 °F (37.1 °C) 103 18 142/89 96 %      Temp Source Heart Rate Source Patient Position - Orthostatic VS BP Location FiO2 (%)   Temporal Monitor Sitting Right arm --      Pain Score       5           Vitals:    06/08/24 1243 06/08/24 1430   BP: 142/89 144/95   Pulse: 103 88   Patient Position - Orthostatic VS: Sitting          Visual Acuity      ED Medications  Medications   sodium chloride 0.9 % bolus 500 mL (500 mL Intravenous New  Bag 6/8/24 1517)   doxycycline hyclate (VIBRAMYCIN) capsule 100 mg (has no administration in time range)   sodium chloride 0.9 % bolus 1,000 mL (0 mL Intravenous Stopped 6/8/24 1527)   acetaminophen (TYLENOL) tablet 650 mg (650 mg Oral Given 6/8/24 1517)   ketorolac (TORADOL) injection 15 mg (15 mg Intravenous Given 6/8/24 1517)       Diagnostic Studies  Results Reviewed       Procedure Component Value Units Date/Time    HS Troponin I 2hr [[id number]]  (Normal) Collected: 06/08/24 1429    Lab Status: Final result Specimen: Blood from Arm, Left Updated: 06/08/24 1524     hs TnI 2hr 33 ng/L      Delta 2hr hsTnI 9 ng/L     HS Troponin I 4hr [[id number]]     Lab Status: No result Specimen: Blood     Ehrlichia antibody panel [[id number]] Collected: 06/08/24 1429    Lab Status: In process Specimen: Blood from Arm, Left Updated: 06/08/24 1432    FLU/RSV/COVID - if FLU/RSV clinically relevant [[id number]]  (Normal) Collected: 06/08/24 1308    Lab Status: Final result Specimen: Nares from Nose Updated: 06/08/24 1354     SARS-CoV-2 Negative     INFLUENZA A PCR Negative     INFLUENZA B PCR Negative     RSV PCR Negative    Narrative:      FOR PEDIATRIC PATIENTS - copy/paste COVID Guidelines URL to browser: [email or web address]    SARS-CoV-2 assay is a Nucleic Acid Amplification assay intended for the  qualitative detection of nucleic acid from SARS-CoV-2 in nasopharyngeal  swabs. Results are for the presumptive identification of SARS-CoV-2 RNA.    Positive results are indicative of infection with SARS-CoV-2, the virus  causing COVID-19, but do not rule out bacterial infection or co-infection  with other viruses. Laboratories within the [place] and its  territories are required to report all positive results to the appropriate  public health authorities. Negative results do not preclude SARS-CoV-2  infection and should not be used as the sole basis for treatment or  other  patient management decisions. Negative results must be combined with  clinical observations, patient history, and epidemiological information.  This test has not been FDA cleared or approved.    This test has been authorized by FDA under an Emergency Use Authorization  (EUA). This test is only authorized for the duration of time the  declaration that circumstances exist justifying the authorization of the  emergency use of an in vitro diagnostic tests for detection of SARS-CoV-2  virus and/or diagnosis of COVID-19 infection under section 564(b)(1) of  the Act, 21 U.S.C. 360bbb-3(b)(1), unless the authorization is terminated  or revoked sooner. The test has been validated but independent review by FDA  and CLIA is pending.    Test performed using [employer]: This RT-PCR assay targets N2,  a region unique to SARS-CoV-2. A conserved region in the E-gene was chosen  for pan-Sarbecovirus detection which includes SARS-CoV-2.    According to CMS-2020-01-R, this platform meets the definition of high-throughput technology.    Anaplasma Phagocytophilum, PCR [[id number]] Collected: 06/08/24 1341    Lab Status: In process Specimen: Blood from Arm, Right Updated: 06/08/24 1343    Blood culture #1 [[id number]] Collected: 06/08/24 1341    Lab Status: In process Specimen: Blood from Arm, Right Updated: 06/08/24 1343    HS Troponin 0hr (reflex protocol) [[id number]]  (Normal) Collected: 06/08/24 1308    Lab Status: Final result Specimen: Blood from Arm, Left Updated: 06/08/24 1339     hs TnI 0hr 24 ng/L     Comprehensive metabolic panel [[id number]] Collected: 06/08/24 1308    Lab Status: Final result Specimen: Blood from Arm, Left Updated: 06/08/24 1336     Sodium 138 mmol/L      Potassium 3.9 mmol/L      Chloride 103 mmol/L      CO2 29 mmol/L      ANION GAP 6 mmol/L      BUN 19 mg/dL      Creatinine 1.15 mg/dL      Glucose 125 mg/dL      Calcium 8.7 mg/dL      AST 34 U/L      ALT 34 U/L      Alkaline Phosphatase 39 U/L       Total Protein 6.5 g/dL      Albumin 4.0 g/dL      Total Bilirubin 0.46 mg/dL      eGFR 77 ml/min/1.73sq m     Narrative:      National Kidney Disease Foundation guidelines for Chronic Kidney Disease (CKD):     Stage 1 with normal or high GFR (GFR > 90 mL/min/1.73 square meters)    Stage 2 Mild CKD (GFR = 60-89 mL/min/1.73 square meters)    Stage 3A Moderate CKD (GFR = 45-59 mL/min/1.73 square meters)    Stage 3B Moderate CKD (GFR = 30-44 mL/min/1.73 square meters)    Stage 4 Severe CKD (GFR = 15-29 mL/min/1.73 square meters)    Stage 5 End Stage CKD (GFR <15 mL/min/1.73 square meters)  Note: GFR calculation is accurate only with a steady state creatinine    Magnesium [[id number]]  (Normal) Collected: 06/08/24 1308    Lab Status: Final result Specimen: Blood from Arm, Left Updated: 06/08/24 1336     Magnesium 2.0 mg/dL     Lipase [[id number]]  (Normal) Collected: 06/08/24 1308    Lab Status: Final result Specimen: Blood from Arm, Left Updated: 06/08/24 1336     Lipase 33 u/L     CK [[id number]]  (Abnormal) Collected: 06/08/24 1308    Lab Status: Final result Specimen: Blood from Arm, Left Updated: 06/08/24 1336     Total  U/L     CBC and differential [[id number]]  (Abnormal) Collected: 06/08/24 1308    Lab Status: Final result Specimen: Blood from Arm, Left Updated: 06/08/24 1327     WBC 2.68 Thousand/uL      RBC 4.84 Million/uL      Hemoglobin 14.2 g/dL      Hematocrit 43.2 %      MCV 89 fL      MCH 29.3 pg      MCHC 32.9 g/dL      RDW 12.1 %      MPV 10.5 fL      Platelets 125 Thousands/uL      nRBC 0 /100 WBCs      Segmented % 79 %      Immature Grans % 1 %      Lymphocytes % 10 %      Monocytes % 10 %      Eosinophils Relative 0 %      Basophils Relative 0 %      Absolute Neutrophils 2.10 Thousands/µL      Absolute Immature Grans 0.03 Thousand/uL      Absolute Lymphocytes 0.26 Thousands/µL      Absolute Monocytes 0.27 Thousand/µL      Eosinophils Absolute 0.01 Thousand/µL      Basophils Absolute 0.01  Thousands/µL     Lyme Total AB W Reflex to IGM/IGG [[id number]] Collected: 06/08/24 1308    Lab Status: In process Specimen: Blood from Arm, Left Updated: 06/08/24 1313    Narrative:      The following orders were created for panel order Lyme Total AB W Reflex to IGM/IGG.  Procedure                               Abnormality         Status                     ---------                               -----------         ------                     Lyme Total AB W Reflex t...[[id number]]                      In process                   Please view results for these tests on the individual orders.    Lyme Total AB W Reflex to IGM/IGG [[id number]] Collected: 06/08/24 1308    Lab Status: In process Specimen: Blood from Arm, Left Updated: 06/08/24 1313    Mononucleosis screen [[id number]] Collected: 06/08/24 1308    Lab Status: In process Specimen: Blood from Arm, Left Updated: 06/08/24 1313    Blood culture #2 [[id number]] Collected: 06/08/24 1308    Lab Status: In process Specimen: Blood from Arm, Left Updated: 06/08/24 1312                   No orders to display              Procedures  ECG 12 Lead Documentation Only    Date/Time: 6/8/2024 1:24 PM    Performed by: Brigido Arenas MD  Authorized by: Brigido Arenas MD    ECG reviewed by me, the ED Provider: yes    Patient location:  ED  Rate:     ECG rate:  90  Rhythm:     Rhythm: sinus rhythm    Ectopy:     Ectopy: none    QRS:     QRS axis:  Normal           ED Course  ED Course as of 06/08/24 1531   Sat Jun 08, 2024   1529 Patient seen.  No chest pain.  Has a fever.  Troponin slightly elevated.  No acute ST changes.  Symptoms been ongoing for the past 4 days.  Doubt this is cardiac in origin.  Slight elevation could be due to infection.  Also due to elevated CK.  Discussed with patient about his low white blood cell count and low platelet count.  Could be secondary to tickborne illness.  Will place on antibiotics.                               SBIRT 20yo+      Flowsheet  Row Most Recent Value   Initial Alcohol Screen: US AUDIT-C     1. How often do you have a drink containing alcohol? 0 Filed at: 06/08/2024 1356   2. How many drinks containing alcohol do you have on a typical day you are drinking?  0 Filed at: 06/08/2024 1356   3a. Male UNDER 65: How often do you have five or more drinks on one occasion? 0 Filed at: 06/08/2024 1356   3b. FEMALE Any Age, or MALE 65+: How often do you have 4 or more drinks on one occassion? 0 Filed at: 06/08/2024 1356   Audit-C Score 0 Filed at: 06/08/2024 1356   MADALYN: How many times in the past year have you...    Used an illegal drug or used a prescription medication for non-medical reasons? Never Filed at: 06/08/2024 1356                      Medical Decision Making  Amount and/or Complexity of Data Reviewed  Labs: ordered.    Risk  OTC drugs.  Prescription drug management.             Disposition  Final diagnoses:   Fever - Possible tickborne illness     Time reflects when diagnosis was documented in both MDM as applicable and the Disposition within this note       Time User Action Codes Description Comment    6/8/2024  2:55 PM Brigido Arenas Add [R50.9] Fever     6/8/2024  2:55 PM Brigido Arenas Modify [R50.9] Fever Possible tickborne illness          ED Disposition       ED Disposition   Discharge    Condition   Stable    Date/Time   Sat Jun 8, 2024 0479    Comment   Marcus Seigfried discharge to home/self care.                   Follow-up Information    None         Patient's Medications   Discharge Prescriptions    DOXYCYCLINE HYCLATE (VIBRAMYCIN) 100 MG CAPSULE    Take 1 capsule (100 mg total) by mouth 2 (two) times a day for 10 days       Start Date: 6/8/2024  End Date: 6/18/2024       Order Dose: 100 mg       Quantity: 20 capsule    Refills: 0       No discharge procedures on file.    PDMP Review       None            ED Provider  Electronically Signed by             Brigido Arenas MD  06/08/24 5894       Brigido Arenas  MD  06/08/24 153

## 2024-06-08 NOTE — Clinical Note
Marcus Cortes was seen and treated in our emergency department on 6/8/2024.                Diagnosis:     Marcus  may return to work on return date.    He may return on this date: 06/11/2024         If you have any questions or concerns, please don't hesitate to call.      Brigido Arenas MD    ______________________________           _______________          _______________  Hospital Representative                              Date                                Time Subjective:       Patient ID: Alison Branch is a 61 y.o. female.        Chief Complaint: URI    Alison Branch is an established patient of Mike Rodriguez Ii, MD here today for urgent care visit.    Sx started 2 weeks ago.  She has been using mucinex, flonase, theraflu.  Sx are worsening not improving.  Coughs all night.  Lots of nasal congestion and purulent nasal discharge.  Coughs up green mucus.   Lots of mucus in throat.  With maxillary sinus pressure.  Mild sore throat.  No chest pain, shortness of breath, or fever.  No N/V/D/C.      DM: not checking home blood sugar, taking januvia and glipizide but not invokana.      HTN: taking losartan, BP acceptable today.    Lipid: taking lipitor.               Review of Systems   Constitutional: Negative for chills, diaphoresis, fatigue and fever.   HENT: Positive for congestion, sinus pressure and sore throat.    Eyes: Negative for visual disturbance.   Respiratory: Positive for cough. Negative for chest tightness and shortness of breath.    Cardiovascular: Negative for chest pain, palpitations and leg swelling.   Gastrointestinal: Negative for abdominal pain, blood in stool, constipation, diarrhea, nausea and vomiting.   Genitourinary: Negative for dysuria, frequency, hematuria and urgency.   Musculoskeletal: Negative for arthralgias and back pain.   Skin: Negative for rash.   Neurological: Negative for dizziness, syncope, weakness and headaches.   Psychiatric/Behavioral: Negative for dysphoric mood and sleep disturbance. The patient is not nervous/anxious.        Objective:      Physical Exam   Constitutional: She appears well-developed and well-nourished.   HENT:   Head: Normocephalic.   Right Ear: External ear normal.   Left Ear: External ear normal.   Nose: Mucosal edema and rhinorrhea present. Right sinus exhibits maxillary sinus tenderness. Right sinus exhibits no frontal sinus tenderness. Left sinus exhibits maxillary sinus tenderness. Left sinus exhibits no  frontal sinus tenderness.   Mouth/Throat: Oropharynx is clear and moist.   Eyes: Pupils are equal, round, and reactive to light.   Cardiovascular: Normal rate, regular rhythm and normal heart sounds. Exam reveals no gallop and no friction rub.   No murmur heard.  Pulmonary/Chest: Effort normal and breath sounds normal. No respiratory distress.   Abdominal: Soft. Normal appearance. There is no tenderness.   Musculoskeletal: She exhibits no edema.   Neurological: She is alert.   Skin: Skin is warm and dry.   Psychiatric: She has a normal mood and affect.   Nursing note and vitals reviewed.      Assessment:       1. Sinusitis, unspecified chronicity, unspecified location    2. Antibiotic-induced yeast infection    3. Hypertension associated with diabetes    4. Uncontrolled type 2 diabetes mellitus with hyperglycemia, without long-term current use of insulin        Plan:       Alison was seen today for uri.    Diagnoses and all orders for this visit:    Sinusitis, unspecified chronicity, unspecified location: sx x 2 weeks and worsening associated with tenderness to palpation in bilateral maxillary sinuses, start augmentin as below  -     amoxicillin-clavulanate 875-125mg (AUGMENTIN) 875-125 mg per tablet; Take 1 tablet by mouth every 12 (twelve) hours.    Antibiotic-induced yeast infection  -     terconazole (TERAZOL 7) 0.4 % Crea; Place 1 applicator vaginally every evening.    Hypertension associated with diabetes: stable and controlled, continue losartan    Uncontrolled type 2 diabetes mellitus with hyperglycemia, without long-term current use of insulin: encouraged to check blood sugar, especially prior to appointment with PCP in a few weeks, bring logs to appointment    Drink plenty of fluids, get lots of rest, and follow-up poor results.     Pt has been given instructions populated from [employer] database and has verbalized understanding of the after visit summary and information contained wherein.    Follow up with a  "primary care provider. May go to ER for acute shortness of breath, lightheadedness, fever, or any other emergent complaints or changes in condition.    "This note will be shared with the patient"    Future Appointments   Date Time Provider Department Center   2/1/2019  8:20 AM Mike Rodriguez II, MD [facility]               "

## 2024-06-08 NOTE — Clinical Note
Marcus Cortes was seen and treated in our emergency department on 6/8/2024.                Diagnosis:     Marcus  may return to work on return date.    He may return on this date: 06/11/2024         If you have any questions or concerns, please don't hesitate to call.      Brigido Arenas MD    ______________________________           _______________          _______________  Hospital Representative                              Date                                Time

## 2024-06-09 LAB
B BURGDOR IGG+IGM SER QL IA: NEGATIVE
HETEROPH AB SER QL: NEGATIVE

## 2024-06-10 LAB
ATRIAL RATE: 91 BPM
P AXIS: 45 DEGREES
PR INTERVAL: 146 MS
QRS AXIS: 4 DEGREES
QRSD INTERVAL: 104 MS
QT INTERVAL: 330 MS
QTC INTERVAL: 405 MS
T WAVE AXIS: 34 DEGREES
VENTRICULAR RATE: 91 BPM

## 2024-06-12 LAB
A PHAGOCYTOPH DNA BLD QL NAA+PROBE: NEGATIVE
A PHAGOCYTOPH IGG TITR SER IF: NEGATIVE {TITER}
A PHAGOCYTOPH IGM TITR SER IF: NEGATIVE {TITER}
E CHAFFEENSIS IGG TITR SER IF: NEGATIVE {TITER}
E CHAFFEENSIS IGM TITR SER IF: NEGATIVE {TITER}
RESULT/COMMENT: NORMAL

## 2024-06-13 LAB
BACTERIA BLD CULT: NORMAL
BACTERIA BLD CULT: NORMAL

## 2024-11-04 ENCOUNTER — HOSPITAL ENCOUNTER (EMERGENCY)
Facility: HOSPITAL | Age: 44
Discharge: HOME | End: 2024-11-05
Attending: EMERGENCY MEDICINE
Payer: COMMERCIAL

## 2024-11-04 VITALS
HEART RATE: 73 BPM | DIASTOLIC BLOOD PRESSURE: 103 MMHG | OXYGEN SATURATION: 96 % | SYSTOLIC BLOOD PRESSURE: 186 MMHG | TEMPERATURE: 97.9 F | RESPIRATION RATE: 18 BRPM

## 2024-11-04 DIAGNOSIS — J32.9 SINUSITIS: ICD-10-CM

## 2024-11-04 DIAGNOSIS — J45.901 ASTHMA EXACERBATION: Primary | ICD-10-CM

## 2024-11-04 PROCEDURE — 99283 EMERGENCY DEPT VISIT LOW MDM: CPT

## 2024-11-04 PROCEDURE — 94640 AIRWAY INHALATION TREATMENT: CPT

## 2024-11-04 NOTE — Clinical Note
shoshana Cortes to the emergency department on 11/4/2024.    Return date if applicable: 11/06/2024        If you have any questions or concerns, please don't hesitate to call.      Dilia Gil, DO

## 2024-11-05 ENCOUNTER — APPOINTMENT (EMERGENCY)
Dept: RADIOLOGY | Facility: HOSPITAL | Age: 44
End: 2024-11-05
Payer: COMMERCIAL

## 2024-11-05 LAB
FLUAV RNA RESP QL NAA+PROBE: NEGATIVE
FLUBV RNA RESP QL NAA+PROBE: NEGATIVE
RSV RNA RESP QL NAA+PROBE: NEGATIVE
SARS-COV-2 RNA RESP QL NAA+PROBE: NEGATIVE

## 2024-11-05 PROCEDURE — 99284 EMERGENCY DEPT VISIT MOD MDM: CPT | Performed by: EMERGENCY MEDICINE

## 2024-11-05 PROCEDURE — 0241U HB NFCT DS VIR RESP RNA 4 TRGT: CPT | Performed by: EMERGENCY MEDICINE

## 2024-11-05 PROCEDURE — 71046 X-RAY EXAM CHEST 2 VIEWS: CPT

## 2024-11-05 RX ORDER — PREDNISONE 20 MG/1
60 TABLET ORAL ONCE
Status: COMPLETED | OUTPATIENT
Start: 2024-11-05 | End: 2024-11-05

## 2024-11-05 RX ORDER — PREDNISONE 20 MG/1
40 TABLET ORAL DAILY
Qty: 8 TABLET | Refills: 0 | Status: SHIPPED | OUTPATIENT
Start: 2024-11-06 | End: 2024-11-10

## 2024-11-05 RX ORDER — IPRATROPIUM BROMIDE AND ALBUTEROL SULFATE 2.5; .5 MG/3ML; MG/3ML
3 SOLUTION RESPIRATORY (INHALATION) ONCE
Status: COMPLETED | OUTPATIENT
Start: 2024-11-05 | End: 2024-11-05

## 2024-11-05 RX ADMIN — IPRATROPIUM BROMIDE AND ALBUTEROL SULFATE 3 ML: .5; 3 SOLUTION RESPIRATORY (INHALATION) at 00:37

## 2024-11-05 RX ADMIN — PREDNISONE 60 MG: 20 TABLET ORAL at 00:37

## 2024-11-05 RX ADMIN — AMOXICILLIN AND CLAVULANATE POTASSIUM 1 TABLET: 875; 125 TABLET, FILM COATED ORAL at 01:24

## 2024-11-05 NOTE — ED PROVIDER NOTES
Time reflects when diagnosis was documented in both MDM as applicable and the Disposition within this note       Time User Action Codes Description Comment    11/5/2024  1:10 AM Dilia Gil [J45.901] Asthma exacerbation     11/5/2024  1:10 AM Dilia Gil [J32.9] Sinusitis           ED Disposition       ED Disposition   Discharge    Condition   Stable    Date/Time   Tue Nov 5, 2024  1:10 AM    Comment   Marcus Seigfried discharge to home/self care.                   Assessment & Plan       Medical Decision Making  Differential diagnosis includes but not limited to: Asthma exacerbation, viral syndrome, pneumonia, bronchitis, sinusitis    Amount and/or Complexity of Data Reviewed  Radiology: ordered.    Risk  Prescription drug management.             Medications   amoxicillin-clavulanate (AUGMENTIN) 875-125 mg per tablet 1 tablet (has no administration in time range)   predniSONE tablet 60 mg (60 mg Oral Given 11/5/24 0037)   ipratropium-albuterol (DUO-NEB) 0.5-2.5 mg/3 mL inhalation solution 3 mL (3 mL Nebulization Given 11/5/24 0037)       ED Risk Strat Scores                                               History of Present Illness       Chief Complaint   Patient presents with    Cough     States he has had a cough and congestion for the last week and is making his asthma flare up       Past Medical History:   Diagnosis Date    Asthma       History reviewed. No pertinent surgical history.   History reviewed. No pertinent family history.   Social History     Tobacco Use    Smoking status: Never    Smokeless tobacco: Never   Vaping Use    Vaping status: Never Used   Substance Use Topics    Alcohol use: Yes    Drug use: Never      E-Cigarette/Vaping    E-Cigarette Use Never User       E-Cigarette/Vaping Substances      I have reviewed and agree with the history as documented.     This is a 43-year-old male presenting to the ED for evaluation of cough and congestion for the past week.  Patient states that he  has a history of asthma and since his symptoms began he has been wheezing.  Denies any fever or chills but admits to a cough.  He states that his cough is mostly dry.        Review of Systems   Constitutional:  Negative for chills and fever.   HENT:  Positive for congestion. Negative for ear pain and sore throat.    Eyes:  Negative for pain and visual disturbance.   Respiratory:  Positive for choking. Negative for cough and shortness of breath.    Cardiovascular:  Negative for chest pain and palpitations.   Gastrointestinal:  Negative for abdominal pain and vomiting.   Genitourinary:  Negative for dysuria and hematuria.   Musculoskeletal:  Negative for arthralgias and back pain.   Skin:  Negative for color change and rash.   Neurological:  Negative for seizures and syncope.   All other systems reviewed and are negative.          Objective       ED Triage Vitals [11/04/24 2242]   Temperature Pulse Blood Pressure Respirations SpO2 Patient Position - Orthostatic VS   97.9 °F (36.6 °C) 73 (!) 186/103 18 96 % Sitting      Temp src Heart Rate Source BP Location FiO2 (%) Pain Score    -- -- Right arm -- --      Vitals      Date and Time Temp Pulse SpO2 Resp BP Pain Score FACES Pain Rating User   11/04/24 2242 97.9 °F (36.6 °C) 73 96 % 18 186/103 -- --             Physical Exam  Vitals and nursing note reviewed.   Constitutional:       General: He is not in acute distress.     Appearance: Normal appearance. He is well-developed. He is obese.   HENT:      Head: Normocephalic and atraumatic.      Right Ear: External ear normal.      Left Ear: External ear normal.      Nose: Congestion present. No rhinorrhea.      Mouth/Throat:      Mouth: Mucous membranes are moist.   Eyes:      Extraocular Movements: Extraocular movements intact.      Conjunctiva/sclera: Conjunctivae normal.   Cardiovascular:      Rate and Rhythm: Normal rate and regular rhythm.      Pulses: Normal pulses.      Heart sounds: Normal heart sounds. No murmur  heard.  Pulmonary:      Effort: Pulmonary effort is normal. No respiratory distress.      Breath sounds: Normal breath sounds. No stridor.   Abdominal:      General: Abdomen is flat. Bowel sounds are normal. There is no distension.      Palpations: Abdomen is soft. There is no mass.      Tenderness: There is no abdominal tenderness.   Musculoskeletal:         General: No swelling. Normal range of motion.      Cervical back: Normal range of motion and neck supple. No rigidity.   Skin:     General: Skin is warm and dry.      Capillary Refill: Capillary refill takes less than 2 seconds.      Coloration: Skin is not jaundiced.      Findings: No bruising or lesion.   Neurological:      General: No focal deficit present.      Mental Status: He is alert and oriented to person, place, and time. Mental status is at baseline.   Psychiatric:         Mood and Affect: Mood normal.         Results Reviewed       Procedure Component Value Units Date/Time    COVID/FLU/RSV [[id number]] Collected: 24    Lab Status: In process Specimen: Nares from Nose Updated: 24            XR chest 2 views    (Results Pending)       Procedures    ED Medication and Procedure Management   Prior to Admission Medications   Prescriptions Last Dose Informant Patient Reported? Taking?   albuterol (2.5 mg/3 mL) 0.083 % nebulizer solution   No No   Sig: Take 3 mL (2.5 mg total) by nebulization every 6 (six) hours as needed for wheezing or shortness of breath   albuterol (PROVENTIL HFA,VENTOLIN HFA) 90 mcg/act inhaler   No No   Si-3 inhalations every 3-4 hours for 3 days and then as needed for cough, wheezing   budesonide-formoterol (SYMBICORT) 160-4.5 mcg/act inhaler   Yes No   Sig: Inhale 2 puffs 2 (two) times a day   cyclobenzaprine (FLEXERIL) 10 mg tablet   No No   Sig: Take 1 tablet (10 mg total) by mouth 2 (two) times a day as needed for muscle spasms   fluticasone (FLONASE) 50 mcg/act nasal spray   Yes No   Si spray into  each nostril 2 (two) times a day   levocetirizine (XYZAL) 5 MG tablet   Yes No   Sig: Take 5 mg by mouth   lidocaine (Lidoderm) 5 %   No No   Sig: Apply 1 patch topically over 12 hours daily for 7 days Apply to area of pain.  Remove & Discard patch within 12 hours or as directed by MD   methylPREDNISolone 4 MG tablet therapy pack   No No   Sig: Use as directed on package   montelukast (SINGULAIR) 10 mg tablet   Yes No   Sig: Take 1 tablet by mouth daily   naproxen (Naprosyn) 500 mg tablet   No No   Sig: Take 1 tablet (500 mg total) by mouth 2 (two) times a day with meals   valsartan (DIOVAN) 80 mg tablet   Yes No   Sig: Take 1 tablet by mouth daily      Facility-Administered Medications: None     Patient's Medications   Discharge Prescriptions    AMOXICILLIN-CLAVULANATE (AUGMENTIN) 875-125 MG PER TABLET    Take 1 tablet by mouth every 12 (twelve) hours for 7 days       Start Date: 11/5/2024 End Date: 11/12/2024       Order Dose: 1 tablet       Quantity: 14 tablet    Refills: 0    PREDNISONE 20 MG TABLET    Take 2 tablets (40 mg total) by mouth daily for 4 days Do not start before November 6, 2024.       Start Date: 11/6/2024 End Date: 11/10/2024       Order Dose: 40 mg       Quantity: 8 tablet    Refills: 0     No discharge procedures on file.  ED SEPSIS DOCUMENTATION   Time reflects when diagnosis was documented in both MDM as applicable and the Disposition within this note       Time User Action Codes Description Comment    11/5/2024  1:10 AM Dilia Gil [J45.901] Asthma exacerbation     11/5/2024  1:10 AM Dilia Gil [J32.9] Sinusitis                  Dilia Gil DO  11/05/24 0122

## 2025-01-14 ENCOUNTER — APPOINTMENT (EMERGENCY)
Dept: RADIOLOGY | Facility: HOSPITAL | Age: 45
End: 2025-01-14

## 2025-01-14 ENCOUNTER — HOSPITAL ENCOUNTER (EMERGENCY)
Facility: HOSPITAL | Age: 45
Discharge: HOME | End: 2025-01-14
Attending: EMERGENCY MEDICINE

## 2025-01-14 VITALS
HEART RATE: 91 BPM | DIASTOLIC BLOOD PRESSURE: 81 MMHG | WEIGHT: 229.5 LBS | SYSTOLIC BLOOD PRESSURE: 135 MMHG | TEMPERATURE: 97.2 F | BODY MASS INDEX: 35.94 KG/M2 | OXYGEN SATURATION: 94 % | RESPIRATION RATE: 18 BRPM

## 2025-01-14 DIAGNOSIS — J06.9 URI (UPPER RESPIRATORY INFECTION): ICD-10-CM

## 2025-01-14 DIAGNOSIS — J45.901 ASTHMA EXACERBATION: Primary | ICD-10-CM

## 2025-01-14 DIAGNOSIS — J40 BRONCHITIS: ICD-10-CM

## 2025-01-14 LAB
FLUAV AG UPPER RESP QL IA.RAPID: NEGATIVE
FLUBV AG UPPER RESP QL IA.RAPID: NEGATIVE
SARS-COV+SARS-COV-2 AG RESP QL IA.RAPID: NEGATIVE

## 2025-01-14 PROCEDURE — 71045 X-RAY EXAM CHEST 1 VIEW: CPT

## 2025-01-14 PROCEDURE — 87804 INFLUENZA ASSAY W/OPTIC: CPT | Performed by: EMERGENCY MEDICINE

## 2025-01-14 PROCEDURE — 87811 SARS-COV-2 COVID19 W/OPTIC: CPT | Performed by: EMERGENCY MEDICINE

## 2025-01-14 PROCEDURE — 99284 EMERGENCY DEPT VISIT MOD MDM: CPT | Performed by: EMERGENCY MEDICINE

## 2025-01-14 PROCEDURE — 99283 EMERGENCY DEPT VISIT LOW MDM: CPT

## 2025-01-14 PROCEDURE — 94640 AIRWAY INHALATION TREATMENT: CPT

## 2025-01-14 RX ORDER — DOXYCYCLINE 100 MG/1
100 CAPSULE ORAL 2 TIMES DAILY
Qty: 14 CAPSULE | Refills: 0 | Status: SHIPPED | OUTPATIENT
Start: 2025-01-14 | End: 2025-01-21

## 2025-01-14 RX ORDER — DOXYCYCLINE 100 MG/1
100 CAPSULE ORAL ONCE
Status: COMPLETED | OUTPATIENT
Start: 2025-01-14 | End: 2025-01-14

## 2025-01-14 RX ORDER — IPRATROPIUM BROMIDE AND ALBUTEROL SULFATE 2.5; .5 MG/3ML; MG/3ML
3 SOLUTION RESPIRATORY (INHALATION) ONCE
Status: COMPLETED | OUTPATIENT
Start: 2025-01-14 | End: 2025-01-14

## 2025-01-14 RX ORDER — IPRATROPIUM BROMIDE AND ALBUTEROL SULFATE 2.5; .5 MG/3ML; MG/3ML
3 SOLUTION RESPIRATORY (INHALATION) EVERY 4 HOURS PRN
Qty: 90 ML | Refills: 0 | Status: SHIPPED | OUTPATIENT
Start: 2025-01-14

## 2025-01-14 RX ORDER — ALBUTEROL SULFATE 90 UG/1
2 INHALANT RESPIRATORY (INHALATION) EVERY 6 HOURS PRN
Qty: 6.7 G | Refills: 0 | Status: SHIPPED | OUTPATIENT
Start: 2025-01-14 | End: 2025-01-14

## 2025-01-14 RX ORDER — PREDNISONE 20 MG/1
60 TABLET ORAL DAILY
Qty: 15 TABLET | Refills: 0 | Status: SHIPPED | OUTPATIENT
Start: 2025-01-14 | End: 2025-01-19

## 2025-01-14 RX ORDER — PREDNISONE 20 MG/1
60 TABLET ORAL ONCE
Status: COMPLETED | OUTPATIENT
Start: 2025-01-14 | End: 2025-01-14

## 2025-01-14 RX ADMIN — IPRATROPIUM BROMIDE AND ALBUTEROL SULFATE 3 ML: .5; 3 SOLUTION RESPIRATORY (INHALATION) at 00:42

## 2025-01-14 RX ADMIN — PREDNISONE 60 MG: 20 TABLET ORAL at 00:42

## 2025-01-14 RX ADMIN — DOXYCYCLINE 100 MG: 100 CAPSULE ORAL at 00:43

## 2025-01-14 NOTE — ED PROVIDER NOTES
Time reflects when diagnosis was documented in both MDM as applicable and the Disposition within this note       Time User Action Codes Description Comment    1/14/2025 12:41 AM Davey Ortega [J45.901] Asthma exacerbation     1/14/2025 12:41 AM Davey Ortega [J06.9] URI (upper respiratory infection)     1/14/2025 12:41 AM Davey Ortega [J40] Bronchitis           ED Disposition       ED Disposition   Discharge    Condition   Stable    Date/Time   Tue Jan 14, 2025 12:41 AM    Comment   Marcus Seigfried discharge to home/self care.                   Assessment & Plan       Medical Decision Making  Differential diagnosis included but not limited to COVID flu pneumonia upper respiratory infection bronchitis asthma exacerbation.  Patient is afebrile nontoxic well-appearing clinically and hemodynamically stable in the emergency department normal O2 saturation on room air no respiratory distress felt improved after steroids and breathing treatment given in the emergency department.  No Focal pneumonia on chest x-ray for now will treat with steroid course home nebulizer and metered-dose inhaler as needed and antibiotics and supportive care advised prompt follow-up with primary physician for further evaluation and treatment and obtain test results return precautions and anticipatory guidance discussed.      Problems Addressed:  Asthma exacerbation: acute illness or injury  Bronchitis: acute illness or injury  URI (upper respiratory infection): acute illness or injury    Amount and/or Complexity of Data Reviewed  Labs: ordered. Decision-making details documented in ED Course.  Radiology: ordered and independent interpretation performed. Decision-making details documented in ED Course.    Risk  Prescription drug management.             Medications   predniSONE tablet 60 mg (60 mg Oral Given 1/14/25 0042)   ipratropium-albuterol (DUO-NEB) 0.5-2.5 mg/3 mL inhalation solution 3 mL (3 mL Nebulization Given 1/14/25 0042)    doxycycline hyclate (VIBRAMYCIN) capsule 100 mg (100 mg Oral Given 1/14/25 0043)       ED Risk Strat Scores                                              History of Present Illness       Chief Complaint   Patient presents with    Asthma     Thinks his asthma is flared up. C/o cough, SOB, and nasal congestion since this AM       Past Medical History:   Diagnosis Date    Asthma       History reviewed. No pertinent surgical history.   History reviewed. No pertinent family history.   Social History     Tobacco Use    Smoking status: Never    Smokeless tobacco: Never   Vaping Use    Vaping status: Never Used   Substance Use Topics    Alcohol use: Yes    Drug use: Never      E-Cigarette/Vaping    E-Cigarette Use Never User       E-Cigarette/Vaping Substances      I have reviewed and agree with the history as documented.     Patient is a 44-year-old male presents emergency department due to cough and wheezing and shortness of breath associated with nasal congestion runny nose symptoms started this morning no fevers no chest pain.        History provided by:  Patient  Asthma  Associated symptoms: congestion, cough, rhinorrhea, shortness of breath and wheezing    Associated symptoms: no abdominal pain, no chest pain, no diarrhea, no fever, no nausea and no vomiting        Review of Systems   Constitutional:  Negative for fever.   HENT:  Positive for congestion and rhinorrhea.    Respiratory:  Positive for cough, shortness of breath and wheezing.    Cardiovascular:  Negative for chest pain.   Gastrointestinal:  Negative for abdominal pain, diarrhea, nausea and vomiting.   All other systems reviewed and are negative.          Objective       ED Triage Vitals [01/14/25 0026]   Temperature Pulse Blood Pressure Respirations SpO2 Patient Position - Orthostatic VS   (!) 97.2 °F (36.2 °C) 91 135/81 18 94 % Sitting      Temp src Heart Rate Source BP Location FiO2 (%) Pain Score    -- Monitor Right arm -- No Pain      Vitals       Date and Time Temp Pulse SpO2 Resp BP Pain Score FACES Pain Rating User   25 0026 97.2 °F (36.2 °C) 91 94 % 18 135/81 No Pain --             Physical Exam  Vitals and nursing note reviewed.   Constitutional:       General: He is not in acute distress.     Appearance: Normal appearance.   HENT:      Head: Normocephalic and atraumatic.      Nose: Congestion and rhinorrhea present.   Eyes:      Conjunctiva/sclera: Conjunctivae normal.   Cardiovascular:      Rate and Rhythm: Normal rate and regular rhythm.   Pulmonary:      Effort: Pulmonary effort is normal. No respiratory distress.      Breath sounds: Wheezing present.   Skin:     General: Skin is dry.   Neurological:      General: No focal deficit present.      Mental Status: He is alert and oriented to person, place, and time.         Results Reviewed       Procedure Component Value Units Date/Time    FLU/COVID Rapid Antigen (30 min. TAT) - Preferred screening test in ED [[id number]] Collected: 25    Lab Status: In process Specimen: Nares from Nose Updated: 25            XR chest 1 view portable   ED Interpretation by Davey Ortega DO ([id number])   No acute cardiopulmonary disease          Procedures    ED Medication and Procedure Management   Prior to Admission Medications   Prescriptions Last Dose Informant Patient Reported? Taking?   albuterol (2.5 mg/3 mL) 0.083 % nebulizer solution   No No   Sig: Take 3 mL (2.5 mg total) by nebulization every 6 (six) hours as needed for wheezing or shortness of breath   albuterol (PROVENTIL HFA,VENTOLIN HFA) 90 mcg/act inhaler   No No   Si-3 inhalations every 3-4 hours for 3 days and then as needed for cough, wheezing   budesonide-formoterol (SYMBICORT) 160-4.5 mcg/act inhaler   Yes No   Sig: Inhale 2 puffs 2 (two) times a day   cyclobenzaprine (FLEXERIL) 10 mg tablet   No No   Sig: Take 1 tablet (10 mg total) by mouth 2 (two) times a day as needed for muscle spasms   fluticasone  (FLONASE) 50 mcg/act nasal spray   Yes No   Si spray into each nostril 2 (two) times a day   levocetirizine (XYZAL) 5 MG tablet   Yes No   Sig: Take 5 mg by mouth   lidocaine (Lidoderm) 5 %   No No   Sig: Apply 1 patch topically over 12 hours daily for 7 days Apply to area of pain.  Remove & Discard patch within 12 hours or as directed by MD   methylPREDNISolone 4 MG tablet therapy pack   No No   Sig: Use as directed on package   montelukast (SINGULAIR) 10 mg tablet   Yes No   Sig: Take 1 tablet by mouth daily   naproxen (Naprosyn) 500 mg tablet   No No   Sig: Take 1 tablet (500 mg total) by mouth 2 (two) times a day with meals   valsartan (DIOVAN) 80 mg tablet   Yes No   Sig: Take 1 tablet by mouth daily      Facility-Administered Medications: None     Patient's Medications   Discharge Prescriptions    DOXYCYCLINE HYCLATE (VIBRAMYCIN) 100 MG CAPSULE    Take 1 capsule (100 mg total) by mouth 2 (two) times a day for 7 days       Start Date: 2025 End Date: 2025       Order Dose: 100 mg       Quantity: 14 capsule    Refills: 0    IPRATROPIUM-ALBUTEROL (DUO-NEB) 0.5-2.5 MG/3 ML NEBULIZER SOLUTION    Take 3 mL by nebulization every 4 (four) hours as needed for wheezing or shortness of breath       Start Date: 2025 End Date: --       Order Dose: 3 mL       Quantity: 90 mL    Refills: 0    PREDNISONE 20 MG TABLET    Take 3 tablets (60 mg total) by mouth daily for 5 days       Start Date: 2025 End Date: 2025       Order Dose: 60 mg       Quantity: 15 tablet    Refills: 0     No discharge procedures on file.  ED SEPSIS DOCUMENTATION   Time reflects when diagnosis was documented in both MDM as applicable and the Disposition within this note       Time User Action Codes Description Comment    2025 12:41 AM Davey Ortega [J45.901] Asthma exacerbation     2025 12:41 AM Davey Ortega [J06.9] URI (upper respiratory infection)     2025 12:41 AM Davey Ortega [J40] Bronchitis                   Davey Ortega,   01/14/25 0053